# Patient Record
Sex: FEMALE | Race: BLACK OR AFRICAN AMERICAN | NOT HISPANIC OR LATINO | Employment: UNEMPLOYED | URBAN - METROPOLITAN AREA
[De-identification: names, ages, dates, MRNs, and addresses within clinical notes are randomized per-mention and may not be internally consistent; named-entity substitution may affect disease eponyms.]

---
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
## 2019-06-21 NOTE — ED NOTES
Bed: ED23  Expected date: 12/31/17  Expected time: 8:28 PM  Means of arrival: Ambulance  Comments:  BV2. 17F. Vag Bleed     [Yes] : Patient goes to dentist yearly [Normal] : normal [LMP: _____] : LMP: [unfilled] [Sleep Concerns] : sleep concerns [Grade: ____] : Grade: [unfilled] [Uses electronic nicotine delivery system] : uses electronic nicotine delivery system [No] : No cigarette smoke exposure [Uses safety belts/safety equipment] : uses safety belts/safety equipment  [With Teen] : teen [Uses tobacco] : does not use tobacco [Uses drugs] : does not use drugs  [Drinks alcohol] : does not drink alcohol [Impaired/distracted driving] : no impaired/distracted driving [FreeTextEntry7] : doing well [de-identified] : none today [de-identified] : doing well [de-identified] : variety of foods [de-identified] : goes to gym, works at [employer] [FreeTextEntry1] : 19 yo female presents for well visit

## 2020-07-14 ENCOUNTER — HOSPITAL ENCOUNTER (EMERGENCY)
Facility: CLINIC | Age: 20
Discharge: HOME | End: 2020-07-15
Attending: EMERGENCY MEDICINE | Admitting: EMERGENCY MEDICINE
Payer: COMMERCIAL

## 2020-07-14 ENCOUNTER — OFFICE VISIT (OUTPATIENT)
Dept: URGENT CARE | Facility: URGENT CARE | Age: 20
End: 2020-07-14
Payer: COMMERCIAL

## 2020-07-14 VITALS
WEIGHT: 117.5 LBS | DIASTOLIC BLOOD PRESSURE: 82 MMHG | SYSTOLIC BLOOD PRESSURE: 121 MMHG | TEMPERATURE: 98.8 F | OXYGEN SATURATION: 100 % | BODY MASS INDEX: 19.55 KG/M2 | HEART RATE: 93 BPM

## 2020-07-14 DIAGNOSIS — N30.00 ACUTE CYSTITIS WITHOUT HEMATURIA: ICD-10-CM

## 2020-07-14 DIAGNOSIS — N10 PYELONEPHRITIS, ACUTE: ICD-10-CM

## 2020-07-14 DIAGNOSIS — R30.0 DYSURIA: Primary | ICD-10-CM

## 2020-07-14 LAB
ALBUMIN UR-MCNC: 30 MG/DL
ANION GAP SERPL CALCULATED.3IONS-SCNC: 6 MMOL/L (ref 3–14)
APPEARANCE UR: CLEAR
BACTERIA #/AREA URNS HPF: ABNORMAL /HPF
BILIRUB UR QL STRIP: NEGATIVE
BUN SERPL-MCNC: 6 MG/DL (ref 7–30)
CALCIUM SERPL-MCNC: 8 MG/DL (ref 8.5–10.1)
CHLORIDE SERPL-SCNC: 102 MMOL/L (ref 94–109)
CO2 SERPL-SCNC: 26 MMOL/L (ref 20–32)
COLOR UR AUTO: YELLOW
CREAT SERPL-MCNC: 0.82 MG/DL (ref 0.52–1.04)
GFR SERPL CREATININE-BSD FRML MDRD: >90 ML/MIN/{1.73_M2}
GLUCOSE SERPL-MCNC: 98 MG/DL (ref 70–99)
GLUCOSE UR STRIP-MCNC: NEGATIVE MG/DL
HGB UR QL STRIP: ABNORMAL
KETONES UR STRIP-MCNC: NEGATIVE MG/DL
LACTATE BLD-SCNC: 0.8 MMOL/L (ref 0.7–2)
LEUKOCYTE ESTERASE UR QL STRIP: ABNORMAL
NITRATE UR QL: NEGATIVE
NON-SQ EPI CELLS #/AREA URNS LPF: ABNORMAL /LPF
PH UR STRIP: 6 PH (ref 5–7)
POTASSIUM SERPL-SCNC: 3.2 MMOL/L (ref 3.4–5.3)
RBC #/AREA URNS AUTO: ABNORMAL /HPF
SODIUM SERPL-SCNC: 134 MMOL/L (ref 133–144)
SOURCE: ABNORMAL
SP GR UR STRIP: 1.01 (ref 1–1.03)
SPECIMEN SOURCE: NORMAL
UROBILINOGEN UR STRIP-ACNC: 1 EU/DL (ref 0.2–1)
WBC #/AREA URNS AUTO: >100 /HPF
WET PREP SPEC: NORMAL

## 2020-07-14 PROCEDURE — 80048 BASIC METABOLIC PNL TOTAL CA: CPT | Performed by: EMERGENCY MEDICINE

## 2020-07-14 PROCEDURE — 25000132 ZZH RX MED GY IP 250 OP 250 PS 637: Performed by: EMERGENCY MEDICINE

## 2020-07-14 PROCEDURE — 87210 SMEAR WET MOUNT SALINE/INK: CPT | Performed by: FAMILY MEDICINE

## 2020-07-14 PROCEDURE — 99284 EMERGENCY DEPT VISIT MOD MDM: CPT | Mod: 25

## 2020-07-14 PROCEDURE — 99203 OFFICE O/P NEW LOW 30 MIN: CPT | Performed by: FAMILY MEDICINE

## 2020-07-14 PROCEDURE — 83605 ASSAY OF LACTIC ACID: CPT | Performed by: EMERGENCY MEDICINE

## 2020-07-14 PROCEDURE — 96365 THER/PROPH/DIAG IV INF INIT: CPT

## 2020-07-14 PROCEDURE — 84702 CHORIONIC GONADOTROPIN TEST: CPT

## 2020-07-14 PROCEDURE — 25800030 ZZH RX IP 258 OP 636: Performed by: EMERGENCY MEDICINE

## 2020-07-14 PROCEDURE — 87186 SC STD MICRODIL/AGAR DIL: CPT | Performed by: FAMILY MEDICINE

## 2020-07-14 PROCEDURE — 96375 TX/PRO/DX INJ NEW DRUG ADDON: CPT

## 2020-07-14 PROCEDURE — 85025 COMPLETE CBC W/AUTO DIFF WBC: CPT | Performed by: EMERGENCY MEDICINE

## 2020-07-14 PROCEDURE — 25000128 H RX IP 250 OP 636: Performed by: EMERGENCY MEDICINE

## 2020-07-14 PROCEDURE — 87088 URINE BACTERIA CULTURE: CPT | Performed by: FAMILY MEDICINE

## 2020-07-14 PROCEDURE — 87086 URINE CULTURE/COLONY COUNT: CPT | Performed by: FAMILY MEDICINE

## 2020-07-14 PROCEDURE — 81001 URINALYSIS AUTO W/SCOPE: CPT | Performed by: FAMILY MEDICINE

## 2020-07-14 RX ORDER — CEPHALEXIN 250 MG/5ML
10 POWDER, FOR SUSPENSION ORAL 3 TIMES DAILY
Qty: 210 ML | Refills: 0 | Status: SHIPPED | OUTPATIENT
Start: 2020-07-14 | End: 2020-07-21

## 2020-07-14 RX ORDER — ACETAMINOPHEN 500 MG
1000 TABLET ORAL ONCE
Status: COMPLETED | OUTPATIENT
Start: 2020-07-14 | End: 2020-07-14

## 2020-07-14 RX ORDER — CEFTRIAXONE 1 G/1
1 INJECTION, POWDER, FOR SOLUTION INTRAMUSCULAR; INTRAVENOUS ONCE
Status: COMPLETED | OUTPATIENT
Start: 2020-07-14 | End: 2020-07-15

## 2020-07-14 RX ORDER — KETOROLAC TROMETHAMINE 15 MG/ML
15 INJECTION, SOLUTION INTRAMUSCULAR; INTRAVENOUS ONCE
Status: COMPLETED | OUTPATIENT
Start: 2020-07-14 | End: 2020-07-14

## 2020-07-14 RX ORDER — CEPHALEXIN 250 MG/5ML
500 POWDER, FOR SUSPENSION ORAL 3 TIMES DAILY
Qty: 210 ML | Refills: 0 | Status: SHIPPED | OUTPATIENT
Start: 2020-07-14 | End: 2020-07-21

## 2020-07-14 RX ADMIN — SODIUM CHLORIDE 500 ML: 9 INJECTION, SOLUTION INTRAVENOUS at 23:23

## 2020-07-14 RX ADMIN — ACETAMINOPHEN 1000 MG: 500 TABLET, FILM COATED ORAL at 23:23

## 2020-07-14 RX ADMIN — CEFTRIAXONE SODIUM 1 G: 1 INJECTION, POWDER, FOR SOLUTION INTRAMUSCULAR; INTRAVENOUS at 23:23

## 2020-07-14 RX ADMIN — KETOROLAC TROMETHAMINE 15 MG: 15 INJECTION, SOLUTION INTRAMUSCULAR; INTRAVENOUS at 23:23

## 2020-07-14 NOTE — ED AVS SNAPSHOT
[facility] Emergency Department  [address]  [facility] [address]  Phone:  [phone]  Fax:  [phone]                                    Muna Husain   MRN: [id number]    Department:  [facility] Emergency Department   Date of Visit:  7/14/2020           After Visit Summary Signature Page    I have received my discharge instructions, and my questions have been answered. I have discussed any challenges I see with this plan with the nurse or doctor.    ..........................................................................................................................................  Patient/Patient Representative Signature      ..........................................................................................................................................  Patient Representative Print Name and Relationship to Patient    ..................................................               ................................................  Date                                   Time    ..........................................................................................................................................  Reviewed by Signature/Title    ...................................................              ..............................................  Date                                               Time          22EPIC Rev 08/18

## 2020-07-14 NOTE — PATIENT INSTRUCTIONS
"Cephalexin three times a day for the next week to fight urinary tract infection.    If you want to get relief from the pain, you can take AZO/Urostate.  This only comes as a pill.  Follow the instructions on the package for this medicine.    Drink lots of water.  Patient Education     Bladder Infection, Female (Adult)    Urine is normally doesn't have any bacteria in it. But bacteria can get into the urinary tract from the skin around the rectum. Or they can travel in the blood from elsewhere in the body. Once they are in your urinary tract, they can cause infection in the urethra (urethritis), the bladder (cystitis), or the kidneys (pyelonephritis).  The most common place for an infection is in the bladder. This is called a bladder infection. This is one of the most common infections in women. Most bladder infections are easily treated. They are not serious unless the infection spreads to the kidney.  The phrases \"bladder infection,\" \"UTI,\" and \"cystitis\" are often used to describe the same thing. But they are not always the same. Cystitis is an inflammation of the bladder. The most common cause of cystitis is an infection.  Symptoms  The infection causes inflammation in the urethra and bladder. This causes many of the symptoms. The most common symptoms of a bladder infection are:    Pain or burning when urinating    Having to urinate more often than usual    Urgent need to urinate    Only a small amount of urine comes out    Blood in urine    Abdominal discomfort. This is usually in the lower abdomen above the pubic bone.    Cloudy urine    Strong- or bad-smelling urine    Unable to urinate (urinary retention)    Unable to hold urine in (urinary incontinence)    Fever    Loss of appetite    Confusion (in older adults)  Causes  Bladder infections are not contagious. You can't get one from someone else, from a toilet seat, or from sharing a bath.  The most common cause of bladder infections is bacteria from the " bowels. The bacteria get onto the skin around the opening of the urethra. From there, they can get into the urine and travel up to the bladder, causing inflammation and infection. This usually happens because of:    Wiping improperly after urinating. Always wipe from front to back.    Bowel incontinence    Pregnancy    Procedures such as having a catheter inserted    Older age    Not emptying your bladder. This can allow bacteria a chance to grow in your urine.    Dehydration    Constipation    Sex    Use of a diaphragm for birth control   Treatment  Bladder infections are diagnosed by a urine test. They are treated with antibiotics and usually clear up quickly without complications. Treatment helps prevent a more serious kidney infection.  Medicines  Medicines can help in the treatment of a bladder infection:    Take antibiotics until they are used up, even if you feel better. It is important to finish them to make sure the infection has cleared.    You can use acetaminophen or ibuprofen for pain, fever, or discomfort, unless another medicine was prescribed. If you have chronic liver or kidney disease, talk with your healthcare provider before using these medicines. Also talk with your provider if you've ever had a stomach ulcer or gastrointestinal bleeding, or are taking blood-thinner medicines.    If you are given phenazopydridine to reduce burning with urination, it will cause your urine to become a bright orange color. This can stain clothing.  Care and prevention  These self-care steps can help prevent future infections:    Drink plenty of fluids to prevent dehydration and flush out your bladder. Do this unless you must restrict fluids for other health reasons, or your doctor told you not to.    Proper cleaning after going to the bathroom is important. Wipe from front to back after using the toilet to prevent the spread of bacteria.    Urinate more often. Don't try to hold urine in for a long time.    Wear  loose-fitting clothes and cotton underwear. Avoid tight-fitting pants.    Improve your diet and prevent constipation. Eat more fresh fruit and vegetables, and fiber, and less junk and fatty foods.    Avoid sex until your symptoms are gone.    Avoid caffeine, alcohol, and spicy foods. These can irritate your bladder.    Urinate right after intercourse to flush out your bladder.    If you use birth control pills and have frequent bladder infections, discuss it with your doctor.  Follow-up care  Call your healthcare provider if all symptoms are not gone after 3 days of treatment. This is especially important if you have repeat infections.  If a culture was done, you will be told if your treatment needs to be changed. If directed, you can call to find out the results.  If X-rays were done, you will be told if the results will affect your treatment.  Call 911  Call 911 if any of the following occur:    Trouble breathing    Hard to wake up or confusion    Fainting or loss of consciousness    Rapid heart rate  When to seek medical advice  Call your healthcare provider right away if any of these occur:    Fever of 100.4 F (38.0 C) or higher, or as directed by your healthcare provider    Symptoms are not better by the third day of treatment    Back or belly (abdominal) pain that gets worse    Repeated vomiting, or unable to keep medicine down    Weakness or dizziness    Vaginal discharge    Pain, redness, or swelling in the outer vaginal area (labia)  Date Last Reviewed: 10/1/2016    0480-6589 [employer]. [address] [place] [address], [place] [address]. All rights reserved. This information is not intended as a substitute for professional medical care. Always follow your healthcare professional's instructions.

## 2020-07-14 NOTE — PROGRESS NOTES
SUBJECTIVE:   Muna Husain is a 20 year old female who  presents today for a possible UTI. Symptoms of dysuria have been going on for 1day(s).  Hematuria no.  sudden onset and still present and moderate.  There is no history of fever, chills, nausea or vomiting.  No history of vaginal discharge. This patient does have a history of urinary tract infections. Patient denies long duration, rigors and flank pain.    Past Medical History:   Diagnosis Date     Heavy menstrual bleeding      Current Outpatient Medications   Medication Sig Dispense Refill     benzonatate (TESSALON) 200 MG capsule Take 1 capsule (200 mg) by mouth 3 times daily as needed for cough 21 capsule 0     ferrous sulfate (SLO-FE) 142 (45 FE) MG TBCR Take 1 tablet (142 mg) by mouth 3 times daily (with meals) 30 tablet 3     Pediatric Multivit-Minerals-C ([facility] GUMMY MULTIVITAMIN KIDS) CHEW Take 1 tablet by mouth daily 60 tablet 3     Social History     Tobacco Use     Smoking status: Never Smoker     Smokeless tobacco: Never Used   Substance Use Topics     Alcohol use: No     ROS:   As above.    OBJECTIVE:  /82   Pulse 93   Temp 98.8  F (37.1  C)   Wt 53.3 kg (117 lb 8 oz)   SpO2 100%   BMI 19.55 kg/m    GENERAL APPEARANCE: healthy, alert and no distress    Results for orders placed or performed in visit on 07/14/20   *UA reflex to Microscopic and Culture ([facility] (except [place] and [place])     Status: Abnormal    Specimen: Midstream Urine   Result Value Ref Range    Color Urine Yellow     Appearance Urine Clear     Glucose Urine Negative NEG^Negative mg/dL    Bilirubin Urine Negative NEG^Negative    Ketones Urine Negative NEG^Negative mg/dL    Specific Gravity Urine 1.015 1.003 - 1.035    Blood Urine Small (A) NEG^Negative    pH Urine 6.0 5.0 - 7.0 pH    Protein Albumin Urine 30 (A) NEG^Negative mg/dL    Urobilinogen Urine 1.0 0.2 - 1.0 EU/dL    Nitrite Urine Negative NEG^Negative    Leukocyte Esterase Urine Moderate  (A) NEG^Negative    Source Midstream Urine    Urine Microscopic     Status: Abnormal   Result Value Ref Range    WBC Urine >100 (A) OTO5^0 - 5 /HPF    RBC Urine 10-25 (A) OTO2^O - 2 /HPF    Squamous Epithelial /LPF Urine Few FEW^Few /LPF    Bacteria Urine Many (A) NEG^Negative /HPF   Wet prep     Status: None    Specimen: Vagina   Result Value Ref Range    Specimen Description Vagina     Wet Prep No Trichomonas seen     Wet Prep No clue cells seen     Wet Prep No yeast seen     Wet Prep Rare  WBC'S seen        ASSESSMENT:   Lower, uncomplicated urinary tract infection.    PLAN:  Cephalexin TID x 7 days as per ordered above.  Pt has trouble with pills and prefers a liquid medicine option.  Drink plenty of fluids.  Prevention and treatment of UTI's discussed.Signs and symptoms of pyelonephritis mentioned.  Follow up if not better in 3 days, sooner if worsening.

## 2020-07-15 VITALS
OXYGEN SATURATION: 100 % | TEMPERATURE: 100.2 F | RESPIRATION RATE: 16 BRPM | DIASTOLIC BLOOD PRESSURE: 67 MMHG | HEART RATE: 96 BPM | SYSTOLIC BLOOD PRESSURE: 112 MMHG

## 2020-07-15 LAB
ALBUMIN UR-MCNC: NEGATIVE MG/DL
APPEARANCE UR: CLEAR
B-HCG FREE SERPL-ACNC: <5 IU/L
BILIRUB UR QL STRIP: NEGATIVE
COLOR UR AUTO: ABNORMAL
DIFFERENTIAL METHOD BLD: ABNORMAL
ERYTHROCYTE [DISTWIDTH] IN BLOOD BY AUTOMATED COUNT: 19.6 % (ref 10–15)
GLUCOSE UR STRIP-MCNC: NEGATIVE MG/DL
HCT VFR BLD AUTO: 31.9 % (ref 35–47)
HGB BLD-MCNC: 8.9 G/DL (ref 11.7–15.7)
HGB UR QL STRIP: ABNORMAL
KETONES UR STRIP-MCNC: ABNORMAL MG/DL
LEUKOCYTE ESTERASE UR QL STRIP: ABNORMAL
MCH RBC QN AUTO: 19.8 PG (ref 26.5–33)
MCHC RBC AUTO-ENTMCNC: 27.9 G/DL (ref 31.5–36.5)
MCV RBC AUTO: 71 FL (ref 78–100)
MUCOUS THREADS #/AREA URNS LPF: PRESENT /LPF
NITRATE UR QL: NEGATIVE
PH UR STRIP: 7 PH (ref 5–7)
PLATELET # BLD AUTO: 236 10E9/L (ref 150–450)
RBC # BLD AUTO: 4.49 10E12/L (ref 3.8–5.2)
RBC #/AREA URNS AUTO: 8 /HPF (ref 0–2)
SOURCE: ABNORMAL
SP GR UR STRIP: 1.01 (ref 1–1.03)
SQUAMOUS #/AREA URNS AUTO: 3 /HPF (ref 0–1)
UROBILINOGEN UR STRIP-MCNC: NORMAL MG/DL (ref 0–2)
WBC # BLD AUTO: 7.3 10E9/L (ref 4–11)
WBC #/AREA URNS AUTO: 160 /HPF (ref 0–5)

## 2020-07-15 PROCEDURE — 81001 URINALYSIS AUTO W/SCOPE: CPT | Performed by: EMERGENCY MEDICINE

## 2020-07-15 PROCEDURE — 87086 URINE CULTURE/COLONY COUNT: CPT | Performed by: EMERGENCY MEDICINE

## 2020-07-15 NOTE — DISCHARGE INSTRUCTIONS
Take the antibiotic as prescribed.  We have given you another prescription of the antibiotic for an additional 7 days.     Alternate Tylenol and ibuprofen every 4-6 hours as needed for pain/fever.    Follow up with your primary care doctor in the next 3 days regarding your presentation today.    Return to the ER if you develop any persistent fever, worsening pain, persistent nausea/vomiting or any new concerning symptom.

## 2020-07-15 NOTE — ED PROVIDER NOTES
History     Chief Complaint:  Fever and Flank Pain    HPI   Muna Husain is a 20 year old otherwise healthy female, with history of UTIs, who presents alone for evaluation of asubjective fever x 1 with associated dysuria that started yesterday.  Patient seen in urgent care today for the symptoms and was found to have UTI and started on Keflex.  She has had 1 dose today.  She went to sleep and when she woke up at 5 PM, she had right flank pain and continued subjective fevers.  She denies any chest pain, coughing, shortness of breath, abdominal pain, history of kidney stones, abnormal vaginal discharge or concern for STI.  Last menstrual period was last week and she has no concerns for pregnancy.      UA Results from  Visit 7/14/2020:  UA reflex to Microscopic and Culture: Blood Urine Small (A), Protein Albumin Urine 30 (A), Leukocyte Esterase Urine Moderate (A) o/w WNL   Urine Microscopic: WBC >100 (A), RBC Urine 10-25 (A), Bacteria Urine Many (A) o/w WNL  Wet Prep: AWNL    Allergies:  No Known Drug Allergies     Medications:    Keflex    Past Medical History:    Heavy menstrual bleeding    Past Surgical History:    History reviewed. No pertinent past surgical history.     Family History:    History reviewed. No pertinent family history.       Social History:  The patient was accompanied to the ED by family.  Smoking Status: Never  Smokeless Tobacco: Never  Alcohol Use: No  Drug Use: No   Marital Status:  Single [1]     Review of Systems    Physical Exam     Patient Vitals for the past 24 hrs:   BP Temp Temp src Pulse Heart Rate Resp SpO2   07/15/20 0055 112/67 -- -- -- -- 16 100 %   07/14/20 2121 103/72 100.2  F (37.9  C) Temporal 96 96 16 --       Physical Exam  General: Alert, no acute distress; nontoxic appearing  HEENT:  Moist mucous membranes. Conjunctiva normal.   CV:  RRR, no m/r/g, skin warm and well perfused  Pulm:  CTAB, no wheezes/ronchi/rales.  No acute distress, breathing comfortably  GI:  Soft,  nontender, nondistended.  No rebound or guarding.  Normal bowel sounds  MSK:  Moving all extremities.  No focal areas of edema, erythema; right CVA tenderness  Skin:  WWP, no rashes, no lower extremity edema, skin color normal, no diaphoresis    Emergency Department Course       Laboratory:  Labs Ordered and Resulted from Time of ED Arrival Up to the Time of Departure from the ED   CBC WITH PLATELETS DIFFERENTIAL - Abnormal; Notable for the following components:       Result Value    Hemoglobin 8.9 (*)     Hematocrit 31.9 (*)     MCV 71 (*)     MCH 19.8 (*)     MCHC 27.9 (*)     RDW 19.6 (*)     All other components within normal limits   BASIC METABOLIC PANEL - Abnormal; Notable for the following components:    Potassium 3.2 (*)     Urea Nitrogen 6 (*)     Calcium 8.0 (*)     All other components within normal limits   ROUTINE UA WITH MICROSCOPIC - Abnormal; Notable for the following components:    Ketones Urine Trace (*)     Blood Urine Moderate (*)     Leukocyte Esterase Urine Large (*)     WBC Urine 160 (*)     RBC Urine 8 (*)     Squamous Epithelial /HPF Urine 3 (*)     Mucous Urine Present (*)     All other components within normal limits   LACTIC ACID WHOLE BLOOD   ISTAT HCG QUANTITATIVE PREGNANCY NURSING POCT   PERIPHERAL IV CATHETER   URINE CULTURE AEROBIC BACTERIAL             Interventions:  2323 Tylenol 1000 mg PO  Rocephin 500 mg IV  0.9% NaCl Bolus 1000mL IV  Toradol 15 mg IV    Medications   cefTRIAXone (ROCEPHIN) 1 g vial to attach to  mL bag for ADULTS or NS 50 mL bag for PEDS (0 g Intravenous Stopped 7/15/20 0044)   acetaminophen (TYLENOL) tablet 1,000 mg (1,000 mg Oral Given 7/14/20 2323)   0.9% sodium chloride BOLUS (0 mLs Intravenous Stopped 7/15/20 0044)   ketorolac (TORADOL) injection 15 mg (15 mg Intravenous Given 7/14/20 2323)       Emergency Department Course:  Past medical records, nursing notes, and vitals reviewed.    (2250)   I performed an exam of the patient as documented above.  History obtained from patient.    IV was inserted and blood was drawn for laboratory testing, results above.    The patient provided a urine sample here in the emergency department. This was sent for laboratory testing, findings above.     I rechecked the patient and discussed the results of her workup thus far.    Findings and plan explained to the Patient. Patient discharged home with instructions regarding supportive care, medications, and reasons to return. The importance of close follow-up was reviewed. The patient was prescribed Keflex x 7 days.    I personally reviewed the laboratory  and imaging results with the Patient and answered all related questions prior to discharge.     Impression & Plan     Medical Decision Making:    Muna Husain is a 20 year old female who presents for evaluation of right flank pain, fever.  UA today at  concerning for UTI.  She has taken 1 dose of Keflex.  She has low grade fever here but is otherwise well appearing.  Urinalysis here is consistent with a urinary tract infection; given the systemic symptoms present, signs and symptoms consistent with pyelonephritis.   There is no clinical evidence of perinephric abscess, emphysematous pyelonephritis, ureterolithiasis, appendicitis, colitis, diverticulitis or any intraabdominal catastrophe.  Labs reassuring including lactic acid. She is not pregnant (RN notes iSTAT HCG is negative). I do not feel any imaging is indicated.  Patient was given dose of IV Ceftriaxone in ED; see above.      Given well appearance, I believe the risk of imminent deterioration is low enough that outpatient management is indicated.  Return if increasing pain, vomiting, fever, or inability to tolerate the oral antibiotic.  Follow up with primary physician is indicated in 3 days.    Diagnosis:    ICD-10-CM    1. Pyelonephritis, acute  N10 CBC + differential     Basic metabolic panel (BMP)     UA with Microscopic     Urine Culture Aerobic Bacterial   2.  Acute cystitis without hematuria  N30.00        Disposition:  Discharged to home.    Discharge Medications:  Discharge Medication List as of 7/15/2020 12:59 AM      START taking these medications    Details   !! cephALEXin (KEFLEX) 250 MG/5ML suspension Take 10 mLs (500 mg) by mouth 3 times daily for 7 days, Disp-210 mL,R-0, Local Print       !! - Potential duplicate medications found. Please discuss with provider.          Scribe Disclosure:  I, Keya Leonard, am serving as a scribe at 10:41 PM on 7/14/2020 to document services personally performed by Alfred Pedraza MD based on my observations and the provider's statements to me.   7/14/2020   [facility] EMERGENCY DEPARTMENT       Alfred Pedraza MD  07/15/20 0105

## 2020-07-16 LAB
BACTERIA SPEC CULT: NO GROWTH
Lab: NORMAL
SPECIMEN SOURCE: NORMAL

## 2020-07-22 LAB
BACTERIA SPEC CULT: ABNORMAL
SPECIMEN SOURCE: ABNORMAL

## 2020-08-13 ENCOUNTER — OFFICE VISIT (OUTPATIENT)
Dept: URGENT CARE | Facility: URGENT CARE | Age: 20
End: 2020-08-13
Payer: COMMERCIAL

## 2020-08-13 VITALS
OXYGEN SATURATION: 100 % | DIASTOLIC BLOOD PRESSURE: 56 MMHG | HEART RATE: 83 BPM | TEMPERATURE: 97.6 F | WEIGHT: 118.5 LBS | RESPIRATION RATE: 16 BRPM | SYSTOLIC BLOOD PRESSURE: 105 MMHG | BODY MASS INDEX: 19.72 KG/M2

## 2020-08-13 DIAGNOSIS — R30.0 DYSURIA: Primary | ICD-10-CM

## 2020-08-13 LAB
ALBUMIN UR-MCNC: 100 MG/DL
APPEARANCE UR: CLEAR
BACTERIA #/AREA URNS HPF: ABNORMAL /HPF
BILIRUB UR QL STRIP: NEGATIVE
COLOR UR AUTO: YELLOW
GLUCOSE UR STRIP-MCNC: NEGATIVE MG/DL
HGB UR QL STRIP: ABNORMAL
KETONES UR STRIP-MCNC: NEGATIVE MG/DL
LEUKOCYTE ESTERASE UR QL STRIP: ABNORMAL
NITRATE UR QL: NEGATIVE
NON-SQ EPI CELLS #/AREA URNS LPF: ABNORMAL /LPF
PH UR STRIP: 5.5 PH (ref 5–7)
RBC #/AREA URNS AUTO: ABNORMAL /HPF
SOURCE: ABNORMAL
SP GR UR STRIP: 1.02 (ref 1–1.03)
SPECIMEN SOURCE: ABNORMAL
UROBILINOGEN UR STRIP-ACNC: 0.2 EU/DL (ref 0.2–1)
WBC #/AREA URNS AUTO: ABNORMAL /HPF
WET PREP SPEC: ABNORMAL

## 2020-08-13 PROCEDURE — 81001 URINALYSIS AUTO W/SCOPE: CPT | Performed by: PHYSICIAN ASSISTANT

## 2020-08-13 PROCEDURE — 99213 OFFICE O/P EST LOW 20 MIN: CPT | Performed by: FAMILY MEDICINE

## 2020-08-13 PROCEDURE — 87210 SMEAR WET MOUNT SALINE/INK: CPT | Performed by: PHYSICIAN ASSISTANT

## 2020-08-13 PROCEDURE — 87086 URINE CULTURE/COLONY COUNT: CPT | Performed by: FAMILY MEDICINE

## 2020-08-13 RX ORDER — CIPROFLOXACIN 250 MG/1
250 TABLET, FILM COATED ORAL 2 TIMES DAILY
Qty: 14 TABLET | Refills: 0 | Status: SHIPPED | OUTPATIENT
Start: 2020-08-13 | End: 2020-08-20

## 2020-08-13 NOTE — PROGRESS NOTES
CHIEF COMPLAINT    DYSURIA      HISTORY    Muna has a 2 week h/o dysuria and urgency. No fever. No back or abd pain.    She had a UTI 7- and ultimately received IV Rocephin in the E R. UC 7-14 showed e coli UTI.    She is not having vaginal SX such as discharge.      Patient Active Problem List   Diagnosis     Excessive or frequent menstruation     Microcytic hypochromic anemia       Current Outpatient Medications   Medication Sig Dispense Refill     benzonatate (TESSALON) 200 MG capsule Take 1 capsule (200 mg) by mouth 3 times daily as needed for cough (Patient not taking: Reported on 8/13/2020) 21 capsule 0     ferrous sulfate (SLO-FE) 142 (45 FE) MG TBCR Take 1 tablet (142 mg) by mouth 3 times daily (with meals) (Patient not taking: Reported on 8/13/2020) 30 tablet 3     Pediatric Multivit-Minerals-C ([employer] GUMMY MULTIVITAMIN KIDS) CHEW Take 1 tablet by mouth daily (Patient not taking: Reported on 8/13/2020) 60 tablet 3       REVIEW OF SYSTEMS    No fever  No cough  No V or D  No rash      Past Medical History:   Diagnosis Date     Heavy menstrual bleeding        EXAM  /56   Pulse 83   Temp 97.6  F (36.4  C) (Tympanic)   Resp 16   Wt 53.8 kg (118 lb 8 oz)   SpO2 100%   BMI 19.72 kg/m      NAD.  No CVAT  Abd non distended, non tender.      Results for orders placed or performed in visit on 08/13/20   *UA reflex to Microscopic and Culture ([place] [facility] (except [place] and [place])     Status: Abnormal    Specimen: Midstream Urine   Result Value Ref Range    Color Urine Yellow     Appearance Urine Clear     Glucose Urine Negative NEG^Negative mg/dL    Bilirubin Urine Negative NEG^Negative    Ketones Urine Negative NEG^Negative mg/dL    Specific Gravity Urine 1.020 1.003 - 1.035    Blood Urine Trace (A) NEG^Negative    pH Urine 5.5 5.0 - 7.0 pH    Protein Albumin Urine 100 (A) NEG^Negative mg/dL    Urobilinogen Urine 0.2 0.2 - 1.0 EU/dL    Nitrite Urine Negative NEG^Negative     Leukocyte Esterase Urine Trace (A) NEG^Negative    Source Midstream Urine    Urine Microscopic     Status: Abnormal   Result Value Ref Range    WBC Urine 10-25 (A) OTO5^0 - 5 /HPF    RBC Urine O - 2 OTO2^O - 2 /HPF    Bacteria Urine Few (A) NEG^Negative /HPF    Squamous Epithelial /LPF Urine Few FEW^Few /LPF   Wet prep     Status: Abnormal    Specimen: Vagina   Result Value Ref Range    Specimen Description Vagina     Wet Prep No Trichomonas seen     Wet Prep Few  Clue cells seen   (A)     Wet Prep No yeast seen     Wet Prep Few  WBC'S seen            (R30.0) Dysuria  (primary encounter diagnosis)  Comment:     Unspun urine - obtain culture. Second U/A report is more abnormal. Initially 0 WBC's then reported as 10-25 WBC's.  Patient was treated.    Presumptive treatment of UTI.    Plan: *UA reflex to Microscopic and Culture ([place]         [facility] (except [place] and         [place]), Wet prep, Urine Microscopic, Urine         Culture Aerobic Bacterial, ciprofloxacin         (CIPRO) 250 MG tablet          Suggested clinic appt re BC.

## 2020-08-16 LAB
BACTERIA SPEC CULT: NORMAL
SPECIMEN SOURCE: NORMAL

## 2020-10-03 ENCOUNTER — ANCILLARY PROCEDURE (OUTPATIENT)
Dept: GENERAL RADIOLOGY | Facility: CLINIC | Age: 20
End: 2020-10-03
Attending: INTERNAL MEDICINE
Payer: COMMERCIAL

## 2020-10-03 ENCOUNTER — OFFICE VISIT (OUTPATIENT)
Dept: URGENT CARE | Facility: URGENT CARE | Age: 20
End: 2020-10-03
Payer: COMMERCIAL

## 2020-10-03 VITALS
TEMPERATURE: 98.3 F | OXYGEN SATURATION: 100 % | HEART RATE: 82 BPM | BODY MASS INDEX: 19.64 KG/M2 | DIASTOLIC BLOOD PRESSURE: 64 MMHG | SYSTOLIC BLOOD PRESSURE: 102 MMHG | WEIGHT: 118 LBS

## 2020-10-03 DIAGNOSIS — Z32.00 POSSIBLE PREGNANCY: ICD-10-CM

## 2020-10-03 DIAGNOSIS — R10.84 ABDOMINAL PAIN, GENERALIZED: Primary | ICD-10-CM

## 2020-10-03 LAB — HCG UR QL: NEGATIVE

## 2020-10-03 PROCEDURE — 99214 OFFICE O/P EST MOD 30 MIN: CPT | Performed by: INTERNAL MEDICINE

## 2020-10-03 PROCEDURE — 74019 RADEX ABDOMEN 2 VIEWS: CPT | Performed by: RADIOLOGY

## 2020-10-03 PROCEDURE — 81025 URINE PREGNANCY TEST: CPT | Performed by: INTERNAL MEDICINE

## 2020-10-03 ASSESSMENT — PAIN SCALES - GENERAL: PAINLEVEL: EXTREME PAIN (8)

## 2020-10-03 NOTE — PROGRESS NOTES
"SUBJECTIVE:  Muna Husain, a 20 year old female, presents for evaluation of abdominal pain.  She states that this started last night.  She had consumed an \"appetite stimulant\" syrup and then she ate prior to going to sleep. She then woke with in the middle of the night with pain that migrated from the right side to the left and had the greatest discomfort in the epigastric region.  She felt a need to vomit.  The character of the pain is crampy, churning and grumbling.  She states that she hasn't passed a bowel movement in the past week; states that her normal pattern is to pass BM relatively infrequently.  Denies hard stools or straining, however.  Also asking about getting a pregnancy test.  She reports that she has had unprotected sex in the past two weeks.      ROS:  The following systems have been completely reviewed and are negative except as noted in the HPI: CONSTITUTIONAL, PULMONARY, GASTROINTESTINAL and GENITOURINARY    OBJECTIVE:  /64   Pulse 82   Temp 98.3  F (36.8  C) (Tympanic)   Wt 53.5 kg (118 lb)   LMP 09/04/2020   SpO2 100%   BMI 19.64 kg/m    GENERAL: thin young female, no apparent distress   NECK: no adenopathy, no asymmetry, masses, or scars and thyroid normal to palpation  RESP: clear to auscultation and percussion bilaterally; normal I:E ratio  CV: regular rates and rhythm, normal S1 S2, no S3 or S4 and no murmur, click or rub -  ABDOMEN: soft, non-distended, and with mild generalized tenderness without rebound/guarding.  There is no hepatosplenomegaly or masses and normal bowel sounds.    Plain films of the abdomen, which I have personally reviewed and interpreted, show a non-specific bowel gas pattern without substantial retained stool in the colon.    Urine HCG is negative.    ASSESSMENT/PLAN:    ICD-10-CM    1. Abdominal pain, generalized  R10.84 XR Abdomen 2 Views   2. Possible pregnancy  Z32.00 HCG Qual, Urine ([id number])   The history and physical exam is reassuring for lack of " an acute surgical process and there is no indication of a pathology that would require medical therapy immediately.  Overall pattern may be indicative of an intolerance with the appetite stimulant product that she ingested or another IBS type of pattern.  Manage with diet.  We also discussed strategies for regulating her cycle given significant irregularity.  While her current urine HCG is negative, we discussed the possibility of a false negative given her irregularity and uncertainty on timing of ovulation.  She would be a good candidate for an hormonal OCP method though she has been sensitive to side effects.  Certainly would require a low-dose estrogen if not a progesterone-only approach.    Referred to primary care for these discussions.    Michel Albrecht MD

## 2020-10-03 NOTE — PATIENT INSTRUCTIONS
Options for primary care:  Dr. Jackie Arora    Any of these doctors would be good for you to help with figuring out a strategy to regulate your cycling and your anemia.    Your pregnancy test today is negative.    The x-ray looks good too as does your physical exam with the abdomen.  There isn't anything dangerous that is causing your pain -- it's an irritability of the stomach and intestines which may be related to the appetite stimulant which you drank.  Best thing is to rest your stomach and do clear liquids for today then move to easy foods like soups and yogurt for a day or so.

## 2022-01-25 ENCOUNTER — HOSPITAL ENCOUNTER (EMERGENCY)
Facility: CLINIC | Age: 22
Discharge: HOME | End: 2022-01-25
Attending: EMERGENCY MEDICINE | Admitting: EMERGENCY MEDICINE
Payer: COMMERCIAL

## 2022-01-25 VITALS
SYSTOLIC BLOOD PRESSURE: 103 MMHG | TEMPERATURE: 98.4 F | OXYGEN SATURATION: 100 % | RESPIRATION RATE: 18 BRPM | DIASTOLIC BLOOD PRESSURE: 58 MMHG | HEART RATE: 85 BPM

## 2022-01-25 DIAGNOSIS — N92.1 MENORRHAGIA WITH IRREGULAR CYCLE: ICD-10-CM

## 2022-01-25 LAB
ABO/RH(D): NORMAL
ANION GAP SERPL CALCULATED.3IONS-SCNC: 5 MMOL/L (ref 3–14)
ANTIBODY SCREEN: NEGATIVE
BASOPHILS # BLD AUTO: 0 10E3/UL (ref 0–0.2)
BASOPHILS NFR BLD AUTO: 0 %
BLD PROD TYP BPU: NORMAL
BLOOD COMPONENT TYPE: NORMAL
BUN SERPL-MCNC: 7 MG/DL (ref 7–30)
CALCIUM SERPL-MCNC: 8.9 MG/DL (ref 8.5–10.1)
CHLORIDE BLD-SCNC: 106 MMOL/L (ref 94–109)
CO2 SERPL-SCNC: 25 MMOL/L (ref 20–32)
CODING SYSTEM: NORMAL
CREAT SERPL-MCNC: 0.66 MG/DL (ref 0.52–1.04)
CROSSMATCH: NORMAL
EOSINOPHIL # BLD AUTO: 0 10E3/UL (ref 0–0.7)
EOSINOPHIL NFR BLD AUTO: 0 %
ERYTHROCYTE [DISTWIDTH] IN BLOOD BY AUTOMATED COUNT: 17.9 % (ref 10–15)
GFR SERPL CREATININE-BSD FRML MDRD: >90 ML/MIN/1.73M2
GLUCOSE BLD-MCNC: 89 MG/DL (ref 70–99)
HCG SERPL QL: NEGATIVE
HCT VFR BLD AUTO: 25.2 % (ref 35–47)
HGB BLD-MCNC: 7.1 G/DL (ref 11.7–15.7)
IMM GRANULOCYTES # BLD: 0 10E3/UL
IMM GRANULOCYTES NFR BLD: 0 %
ISSUE DATE AND TIME: NORMAL
LYMPHOCYTES # BLD AUTO: 2.8 10E3/UL (ref 0.8–5.3)
LYMPHOCYTES NFR BLD AUTO: 41 %
MCH RBC QN AUTO: 21.1 PG (ref 26.5–33)
MCHC RBC AUTO-ENTMCNC: 28.2 G/DL (ref 31.5–36.5)
MCV RBC AUTO: 75 FL (ref 78–100)
MONOCYTES # BLD AUTO: 0.5 10E3/UL (ref 0–1.3)
MONOCYTES NFR BLD AUTO: 8 %
NEUTROPHILS # BLD AUTO: 3.5 10E3/UL (ref 1.6–8.3)
NEUTROPHILS NFR BLD AUTO: 51 %
NRBC # BLD AUTO: 0 10E3/UL
NRBC BLD AUTO-RTO: 0 /100
PLATELET # BLD AUTO: 336 10E3/UL (ref 150–450)
POTASSIUM BLD-SCNC: 3.5 MMOL/L (ref 3.4–5.3)
RBC # BLD AUTO: 3.36 10E6/UL (ref 3.8–5.2)
SODIUM SERPL-SCNC: 136 MMOL/L (ref 133–144)
SPECIMEN EXPIRATION DATE: NORMAL
UNIT ABO/RH: NORMAL
UNIT NUMBER: NORMAL
UNIT STATUS: NORMAL
UNIT TYPE ISBT: 5100
WBC # BLD AUTO: 7 10E3/UL (ref 4–11)

## 2022-01-25 PROCEDURE — 258N000003 HC RX IP 258 OP 636: Performed by: EMERGENCY MEDICINE

## 2022-01-25 PROCEDURE — 96366 THER/PROPH/DIAG IV INF ADDON: CPT

## 2022-01-25 PROCEDURE — 99285 EMERGENCY DEPT VISIT HI MDM: CPT | Mod: 25

## 2022-01-25 PROCEDURE — 84703 CHORIONIC GONADOTROPIN ASSAY: CPT | Performed by: EMERGENCY MEDICINE

## 2022-01-25 PROCEDURE — 85049 AUTOMATED PLATELET COUNT: CPT | Performed by: EMERGENCY MEDICINE

## 2022-01-25 PROCEDURE — 36415 COLL VENOUS BLD VENIPUNCTURE: CPT | Performed by: EMERGENCY MEDICINE

## 2022-01-25 PROCEDURE — 96365 THER/PROPH/DIAG IV INF INIT: CPT | Mod: 59

## 2022-01-25 PROCEDURE — P9016 RBC LEUKOCYTES REDUCED: HCPCS | Performed by: EMERGENCY MEDICINE

## 2022-01-25 PROCEDURE — 250N000011 HC RX IP 250 OP 636: Performed by: EMERGENCY MEDICINE

## 2022-01-25 PROCEDURE — 86901 BLOOD TYPING SEROLOGIC RH(D): CPT | Performed by: EMERGENCY MEDICINE

## 2022-01-25 PROCEDURE — 93005 ELECTROCARDIOGRAM TRACING: CPT | Mod: 59

## 2022-01-25 PROCEDURE — 36430 TRANSFUSION BLD/BLD COMPNT: CPT

## 2022-01-25 PROCEDURE — 80048 BASIC METABOLIC PNL TOTAL CA: CPT | Performed by: EMERGENCY MEDICINE

## 2022-01-25 PROCEDURE — 86923 COMPATIBILITY TEST ELECTRIC: CPT | Performed by: EMERGENCY MEDICINE

## 2022-01-25 RX ORDER — ONDANSETRON 4 MG/1
4 TABLET, ORALLY DISINTEGRATING ORAL EVERY 6 HOURS PRN
Qty: 10 TABLET | Refills: 0 | Status: SHIPPED | OUTPATIENT
Start: 2022-01-25 | End: 2022-01-28

## 2022-01-25 RX ORDER — IRON BIS-GLYCINAT/VIT C/FA/B12 28-60-0.4
1 CAPSULE ORAL DAILY
Qty: 30 CAPSULE | Refills: 0 | Status: SHIPPED | OUTPATIENT
Start: 2022-01-25

## 2022-01-25 RX ADMIN — IRON SUCROSE 300 MG: 20 INJECTION, SOLUTION INTRAVENOUS at 20:51

## 2022-01-25 NOTE — ED TRIAGE NOTES
Pt here with c/o lightheadedness since Dec. Reports 3 weeks vag bleeding. Pt not currently having vag bleeding. Doesn't have OBGYN. ABC intact.  by EMS.

## 2022-01-25 NOTE — ED PROVIDER NOTES
"  History     Chief Complaint:    Dizziness      HPI   Muna Husain is a 22 year old female who presents with dizziness with standing and vaginal bleeding.    Patient states she is had a history of irregular and irregularly heavy periods. These date back to 2020. Patient was placed on oral contraceptive medications which seem to do the job but also was recommended iron which she did not tolerate. Patient states \"I gave up\". Stop the medication. Pain since patient continues to have irregular periods this month has been more heavy. Patient describes using 2-3 pads a day. Patient feels dizzy and has difficulty cooking with her mom in the kitchen. She has had no abdominal pain with heavy periods but describes 2-3 pads in a day. Patient states the bleeding was worse and felt dizzy and weak and presents to the emergency room for further assessment.    Review of Systems  G0 para 0. Heavy vaginal bleeding negative for abdominal pain. All the systems negative except as above    Allergies:    No Known Allergies      Medications:      No current outpatient medications on file.      Past Medical History:      Past Medical History:   Diagnosis Date     Heavy menstrual bleeding      Patient Active Problem List    Diagnosis Date Noted     Microcytic hypochromic anemia 01/13/2018     Priority: Medium     Referred to hematology 1/13/18. - [facility]       Excessive or frequent menstruation 01/10/2018     Priority: Medium        Past Surgical History:      No past surgical history on file.    Family History:      No family history on file.    Social History:  Denies drug or alcohol use.    Physical Exam     Patient Vitals for the past 24 hrs:   BP Temp Temp src Pulse Resp SpO2   01/25/22 1517 114/66 97.6  F (36.4  C) Temporal 76 16 100 %       Physical Exam  Vitals and nursing note reviewed.   HENT:      Head: Normocephalic.      Nose: Nose normal.   Eyes:      Pupils: Pupils are equal, round, and reactive to light.      " Comments: Pale conjunctive   Cardiovascular:      Rate and Rhythm: Normal rate.   Pulmonary:      Effort: Pulmonary effort is normal.   Abdominal:      General: Abdomen is flat.      Palpations: Abdomen is soft.   Skin:     General: Skin is warm.      Capillary Refill: Capillary refill takes less than 2 seconds.   Neurological:      General: No focal deficit present.      Mental Status: She is alert.   Psychiatric:         Mood and Affect: Mood normal.           Emergency Department Course   ECG:*  ECG taken at 1640, ECG read at 1645     Rate 86 bpm. WV interval 114 ms. QRS duration 80 ms. QT/QTc 360/430 ms. Normal sinus rythmn    Imaging:  No orders to display       Laboratory:  Labs Ordered and Resulted from Time of ED Arrival to Time of ED Departure   CBC WITH PLATELETS AND DIFFERENTIAL - Abnormal       Result Value    WBC Count 7.0      RBC Count 3.36 (*)     Hemoglobin 7.1 (*)     Hematocrit 25.2 (*)     MCV 75 (*)     MCH 21.1 (*)     MCHC 28.2 (*)     RDW 17.9 (*)     Platelet Count 336      % Neutrophils 51      % Lymphocytes 41      % Monocytes 8      % Eosinophils 0      % Basophils 0      % Immature Granulocytes 0      NRBCs per 100 WBC 0      Absolute Neutrophils 3.5      Absolute Lymphocytes 2.8      Absolute Monocytes 0.5      Absolute Eosinophils 0.0      Absolute Basophils 0.0      Absolute Immature Granulocytes 0.0      Absolute NRBCs 0.0     BASIC METABOLIC PANEL   HCG QUALITATIVE PREGNANCY   TYPE AND SCREEN, ADULT   ABO/RH TYPE AND SCREEN         Emergency Department Course:           Reviewed:    I reviewed nursing notes and vitals    Assessments:  1645 I obtained history and examined the patient as noted above.   1900 I rechecked the patient and explained findings.       Consults:   [facility]         Interventions:    Medications   iron sucrose (VENOFER) 300 mg in sodium chloride 0.9 % 250 mL intermittent infusion (0 mg Intravenous Stopped 1/25/22 2228)       Disposition:  The patient  was discharged to home.    Impression & Plan        Medical Decision Making:  Patient presents with dizziness with standing and heavy vaginal bleeding.  Patient has a history of prior anemia and prior need for blood transfusion.  Patient is failed to follow-up as an outpatient is currently on on no oral contraceptives. Patient is found to have a hemoglobin of 7.1 is not pregnant. Has had no pain with heavy irregular periods and therefore ultrasound was not recommended. Care was discussed with the patient did consider a dose of IV Venofer at home patient is quite symptomatic from her anemia states she can only walk very short distances feels very weak cannot cook in the kitchen with her mother because she is so weak because of the anemia and is asking for everything to be done therefore a unit of blood for transfusion was ordered as well as IV Venofer. Patient stay in the emergency room was delayed due to to transfusions both blood and IV iron. Patient is encouraged to follow-up as an outpatient patient is recommended hematology assessment for failed oral iron we will trial low-dose iron to help with nausea associated with the medication as well as use nausea medication we will also recommend follow-up with GYN care was discussed with [facility] in a few packs of low overall were offered to help stop. Bleeding until follow-up with GYN.    Covid-19  Muna Husain was evaluated during a global COVID-19 pandemic, which necessitated consideration that the patient might be at risk for infection with the SARS-CoV-2 virus that causes COVID-19.   Applicable protocols for evaluation were followed during the patient's care.   COVID-19 was considered as part of the patient's evaluation.    Diagnosis:    ICD-10-CM    1. Menorrhagia with irregular cycle  N92.1        Discharge Medications:  Discharge Medication List as of 1/25/2022 10:43 PM      START taking these medications    Details   IRON-VIT C-VIT B12-FOLIC ACID  (GENTLE IRON) 28-60-0.008-0.4 MG CAPS Take 1 tablet by mouth daily, Disp-30 capsule, R-0, Local Print      norgestrel-ethinyl estradiol (LO/OVRAL) 0.3-30 MG-MCG tablet Take 1 tablet by mouth daily Take three tablets daily x1 week, discard placebo. Then take next pack one tablet daily but discard placebo Continue pills until seen by obgyn, Disp-90 tablet, R-0, Local Print      ondansetron (ZOFRAN ODT) 4 MG ODT tab Take 1 tablet (4 mg) by mouth every 6 hours as needed for nausea or vomiting, Disp-10 tablet, R-0, Local Print               Scribe Disclosure:  I, Dejuan Almazan MD, am serving as a scribe at 5:01 PM on 1/25/2022 to document services personally performed by Dejuan Almazan MD based on my observations and the provider's statements to me.      Dejuan Almazan MD  01/27/22 3438

## 2022-01-26 LAB
ATRIAL RATE - MUSE: 86 BPM
DIASTOLIC BLOOD PRESSURE - MUSE: NORMAL MMHG
INTERPRETATION ECG - MUSE: NORMAL
P AXIS - MUSE: 36 DEGREES
PR INTERVAL - MUSE: 114 MS
QRS DURATION - MUSE: 80 MS
QT - MUSE: 360 MS
QTC - MUSE: 430 MS
R AXIS - MUSE: 66 DEGREES
SYSTOLIC BLOOD PRESSURE - MUSE: NORMAL MMHG
T AXIS - MUSE: 42 DEGREES
VENTRICULAR RATE- MUSE: 86 BPM

## 2022-01-26 NOTE — DISCHARGE INSTRUCTIONS
We have discussed your care with the [facility]. Please start low overall 3 tablets daily for 1 week.  Once you finish the pack and get to the placebo or white pill line please throw the placebo pills away and start the next pack 1 pill a day for 3 weeks.  Please do not take the placebo pill.  Note that if you stop the birth control pills you will bleed heavier.  We have given you a blood transfusion due to hemoglobin of 7.1 and given you a single dose of IV Venofer which is iron.  Consider trialing Gentle Fe as a pill for iron supplementation and/or follow-up with hematology oncology to discuss iron deficiency anemia and further treatment if you cannot tolerate oral iron. Call [phone] for appointment with [facility]

## 2022-12-15 NOTE — RESULT ENCOUNTER NOTE
"Final urine culture report shows \"NO GROWTH\" and is NEGATIVE.  Emergency Dept discharge antibiotic: Cephalexin (Keflex) 250 MG/5ML susp, 10 mLs (500 mg) by mouth 3 times daily for 7 days  Is ED discharge Rx antibiotic for UTI only (Yes/No): Yes  One dose prior to collection of UC  Recommendations per [facility] ED Lab result protocol - Urine culture protocol."
No

## 2023-12-07 ENCOUNTER — HOSPITAL ENCOUNTER (EMERGENCY)
Facility: CLINIC | Age: 23
Discharge: HOME | End: 2023-12-07
Attending: EMERGENCY MEDICINE | Admitting: EMERGENCY MEDICINE
Payer: COMMERCIAL

## 2023-12-07 VITALS
DIASTOLIC BLOOD PRESSURE: 60 MMHG | TEMPERATURE: 98.2 F | RESPIRATION RATE: 16 BRPM | HEART RATE: 64 BPM | SYSTOLIC BLOOD PRESSURE: 106 MMHG | OXYGEN SATURATION: 100 %

## 2023-12-07 DIAGNOSIS — D50.9 IRON DEFICIENCY ANEMIA, UNSPECIFIED IRON DEFICIENCY ANEMIA TYPE: ICD-10-CM

## 2023-12-07 DIAGNOSIS — K92.0 HEMATEMESIS WITH NAUSEA: ICD-10-CM

## 2023-12-07 LAB
ACANTHOCYTES BLD QL SMEAR: ABNORMAL
AUER BODIES BLD QL SMEAR: ABNORMAL
BASO STIPL BLD QL SMEAR: ABNORMAL
BASOPHILS # BLD AUTO: 0 10E3/UL (ref 0–0.2)
BASOPHILS NFR BLD AUTO: 0 %
BITE CELLS BLD QL SMEAR: ABNORMAL
BLISTER CELLS BLD QL SMEAR: ABNORMAL
BURR CELLS BLD QL SMEAR: ABNORMAL
DACRYOCYTES BLD QL SMEAR: ABNORMAL
ELLIPTOCYTES BLD QL SMEAR: SLIGHT
EOSINOPHIL # BLD AUTO: 0.1 10E3/UL (ref 0–0.7)
EOSINOPHIL NFR BLD AUTO: 2 %
ERYTHROCYTE [DISTWIDTH] IN BLOOD BY AUTOMATED COUNT: 19.7 % (ref 10–15)
FRAGMENTS BLD QL SMEAR: SLIGHT
HCG SERPL QL: NEGATIVE
HCT VFR BLD AUTO: 28.6 % (ref 35–47)
HGB BLD-MCNC: 8.3 G/DL (ref 11.7–15.7)
HGB C CRYSTALS: ABNORMAL
HOWELL-JOLLY BOD BLD QL SMEAR: ABNORMAL
IMM GRANULOCYTES # BLD: 0 10E3/UL
IMM GRANULOCYTES NFR BLD: 0 %
IRON BINDING CAPACITY (ROCHE): 392 UG/DL (ref 240–430)
IRON SATN MFR SERPL: 4 % (ref 15–46)
IRON SERPL-MCNC: 16 UG/DL (ref 37–145)
LYMPHOCYTES # BLD AUTO: 1.7 10E3/UL (ref 0.8–5.3)
LYMPHOCYTES NFR BLD AUTO: 23 %
MCH RBC QN AUTO: 19.5 PG (ref 26.5–33)
MCHC RBC AUTO-ENTMCNC: 29 G/DL (ref 31.5–36.5)
MCV RBC AUTO: 67 FL (ref 78–100)
MONOCYTES # BLD AUTO: 0.4 10E3/UL (ref 0–1.3)
MONOCYTES NFR BLD AUTO: 6 %
NEUTROPHILS # BLD AUTO: 5.1 10E3/UL (ref 1.6–8.3)
NEUTROPHILS NFR BLD AUTO: 69 %
NEUTS HYPERSEG BLD QL SMEAR: ABNORMAL
NRBC # BLD AUTO: 0 10E3/UL
NRBC BLD AUTO-RTO: 0 /100
PLAT MORPH BLD: ABNORMAL
PLATELET # BLD AUTO: 283 10E3/UL (ref 150–450)
POLYCHROMASIA BLD QL SMEAR: ABNORMAL
RBC # BLD AUTO: 4.26 10E6/UL (ref 3.8–5.2)
RBC AGGLUT BLD QL: ABNORMAL
RBC MORPH BLD: ABNORMAL
ROULEAUX BLD QL SMEAR: ABNORMAL
SICKLE CELLS BLD QL SMEAR: ABNORMAL
SMUDGE CELLS BLD QL SMEAR: ABNORMAL
SPHEROCYTES BLD QL SMEAR: ABNORMAL
STOMATOCYTES BLD QL SMEAR: ABNORMAL
TARGETS BLD QL SMEAR: ABNORMAL
TOXIC GRANULES BLD QL SMEAR: ABNORMAL
TRANSFERRIN SERPL-MCNC: 319 MG/DL (ref 200–360)
VARIANT LYMPHS BLD QL SMEAR: ABNORMAL
WBC # BLD AUTO: 7.5 10E3/UL (ref 4–11)

## 2023-12-07 PROCEDURE — 85025 COMPLETE CBC W/AUTO DIFF WBC: CPT | Performed by: EMERGENCY MEDICINE

## 2023-12-07 PROCEDURE — 96366 THER/PROPH/DIAG IV INF ADDON: CPT

## 2023-12-07 PROCEDURE — 83550 IRON BINDING TEST: CPT | Performed by: EMERGENCY MEDICINE

## 2023-12-07 PROCEDURE — 250N000013 HC RX MED GY IP 250 OP 250 PS 637: Performed by: EMERGENCY MEDICINE

## 2023-12-07 PROCEDURE — 84466 ASSAY OF TRANSFERRIN: CPT | Performed by: EMERGENCY MEDICINE

## 2023-12-07 PROCEDURE — 258N000003 HC RX IP 258 OP 636: Performed by: EMERGENCY MEDICINE

## 2023-12-07 PROCEDURE — 36415 COLL VENOUS BLD VENIPUNCTURE: CPT | Performed by: EMERGENCY MEDICINE

## 2023-12-07 PROCEDURE — 96375 TX/PRO/DX INJ NEW DRUG ADDON: CPT

## 2023-12-07 PROCEDURE — 99284 EMERGENCY DEPT VISIT MOD MDM: CPT | Mod: 25

## 2023-12-07 PROCEDURE — 84703 CHORIONIC GONADOTROPIN ASSAY: CPT | Performed by: EMERGENCY MEDICINE

## 2023-12-07 PROCEDURE — 250N000011 HC RX IP 250 OP 636: Mod: JZ | Performed by: EMERGENCY MEDICINE

## 2023-12-07 PROCEDURE — 96365 THER/PROPH/DIAG IV INF INIT: CPT

## 2023-12-07 RX ORDER — KETOROLAC TROMETHAMINE 15 MG/ML
15 INJECTION, SOLUTION INTRAMUSCULAR; INTRAVENOUS ONCE
Status: COMPLETED | OUTPATIENT
Start: 2023-12-07 | End: 2023-12-07

## 2023-12-07 RX ORDER — ONDANSETRON 4 MG/1
4 TABLET, ORALLY DISINTEGRATING ORAL ONCE
Status: COMPLETED | OUTPATIENT
Start: 2023-12-07 | End: 2023-12-07

## 2023-12-07 RX ORDER — ONDANSETRON 4 MG/1
4 TABLET, ORALLY DISINTEGRATING ORAL EVERY 6 HOURS PRN
Qty: 10 TABLET | Refills: 0 | Status: SHIPPED | OUTPATIENT
Start: 2023-12-07 | End: 2023-12-09

## 2023-12-07 RX ORDER — MAGNESIUM HYDROXIDE/ALUMINUM HYDROXICE/SIMETHICONE 120; 1200; 1200 MG/30ML; MG/30ML; MG/30ML
30 SUSPENSION ORAL ONCE
Status: COMPLETED | OUTPATIENT
Start: 2023-12-07 | End: 2023-12-07

## 2023-12-07 RX ADMIN — IRON SUCROSE 300 MG: 20 INJECTION, SOLUTION INTRAVENOUS at 12:06

## 2023-12-07 RX ADMIN — ONDANSETRON 4 MG: 4 TABLET, ORALLY DISINTEGRATING ORAL at 10:12

## 2023-12-07 RX ADMIN — ALUMINUM HYDROXIDE, MAGNESIUM HYDROXIDE, AND SIMETHICONE 30 ML: 200; 200; 20 SUSPENSION ORAL at 10:12

## 2023-12-07 RX ADMIN — KETOROLAC TROMETHAMINE 15 MG: 15 INJECTION, SOLUTION INTRAMUSCULAR; INTRAVENOUS at 12:05

## 2023-12-07 ASSESSMENT — ACTIVITIES OF DAILY LIVING (ADL)
ADLS_ACUITY_SCORE: 35
ADLS_ACUITY_SCORE: 35

## 2023-12-07 NOTE — DISCHARGE INSTRUCTIONS
We have given you an IV dose of iron as we saw you 2 years ago and you had the same problem.  If you do not tolerate iron orally please make an appointment with the above hematologist to discuss long-term management of iron deficiency anemia.  We are not concerned of acute blood loss we think the blood in your vomit may be related to retching.  If you develop black tarry stool or large-volume blood in your vomit return to the emergency room for reassessment.  Take Zofran when needed for nausea or vomiting.  Use antacid daily for gastritis.

## 2023-12-07 NOTE — ED NOTES
Pt discharged with written instructions and prescriptions for prilosec and zofran.  Pt verbalizes understanding of follow up with hem/onc for iron transfusion vs blood transfusions in the future.  No further questions at this time, pt was able to ambulate to the ED lobby with mom and without issue.

## 2023-12-07 NOTE — ED TRIAGE NOTES
Pt comes via EMS from home.  Pt had been vomiting for about 20 min when she saw some blood in her vomit.  She states her sister was vomitting yesterday as well.  Per EMS no vomit while she was with them.  Pt states she has low iron     Triage Assessment (Adult)       Row Name 12/07/23 0937          Triage Assessment    Airway WDL WDL        Respiratory WDL    Respiratory WDL WDL        Skin Circulation/Temperature WDL    Skin Circulation/Temperature WDL WDL        Peripheral/Neurovascular WDL    Peripheral Neurovascular WDL WDL        Cognitive/Neuro/Behavioral WDL    Cognitive/Neuro/Behavioral WDL WDL

## 2023-12-07 NOTE — ED PROVIDER NOTES
History     Chief Complaint:  Nausea & Vomiting       The history is provided by the patient.      Muna Husain is a 23 year old female with a history anemia who presents due to nausea and vomiting that began today. The patient reports that her sister is sick with similar symptoms that began the day before. She notes that her sister's symptoms have since resolved. This morning the patient woke up not feeling well. She experienced one episode of vomiting with one column of blood within it prompting her to present to the ED. Currently, she also reports a sore throat and slight abdominal pain from vomiting. The patient notes one episode of diarrhea but denies black or bloody stools.      Independent Historian:   None - Patient Only        Medications:    Iron    Past Medical History:    Anemia     Physical Exam   Patient Vitals for the past 24 hrs:   BP Pulse Resp SpO2   12/07/23 0934 109/89 75 16 99 %        Physical Exam  Vitals reviewed.   HENT:      Head: Normocephalic.      Nose: Nose normal.      Mouth/Throat:      Mouth: Mucous membranes are moist.   Eyes:      Pupils: Pupils are equal, round, and reactive to light.   Cardiovascular:      Rate and Rhythm: Normal rate.   Pulmonary:      Effort: Pulmonary effort is normal.   Abdominal:      General: Abdomen is flat. Bowel sounds are normal.   Skin:     Coloration: Skin is pale.   Neurological:      Mental Status: She is alert.           Emergency Department Course     Imaging:  No orders to display          Laboratory:  Labs Ordered and Resulted from Time of ED Arrival to Time of ED Departure   CBC WITH PLATELETS AND DIFFERENTIAL - Abnormal       Result Value    WBC Count 7.5      RBC Count 4.26      Hemoglobin 8.3 (*)     Hematocrit 28.6 (*)     MCV 67 (*)     MCH 19.5 (*)     MCHC 29.0 (*)     RDW 19.7 (*)     Platelet Count 283      % Neutrophils 69      % Lymphocytes 23      % Monocytes 6      % Eosinophils 2      % Basophils 0      % Immature Granulocytes  0      NRBCs per 100 WBC 0      Absolute Neutrophils 5.1      Absolute Lymphocytes 1.7      Absolute Monocytes 0.4      Absolute Eosinophils 0.1      Absolute Basophils 0.0      Absolute Immature Granulocytes 0.0      Absolute NRBCs 0.0     RBC AND PLATELET MORPHOLOGY - Abnormal    Platelet Assessment        Value: Automated Count Confirmed. Platelet morphology is normal.    Acanthocytes        Lex Rods        Basophilic Stippling        Bite Cells        Blister Cells        Garden Cells        Elliptocytes Slight (*)     Hgb C Crystals        Troy-Jolly Bodies        Hypersegmented Neutrophils        Polychromasia        RBC agglutination        RBC Fragments Slight (*)     Reactive Lymphocytes        Rouleaux        Sickle Cells        Smudge Cells        Spherocytes        Stomatocytes        Target Cells        Teardrop Cells        Toxic Neutrophils        RBC Morphology Confirmed RBC Indices     IRON AND IRON BINDING CAPACITY - Abnormal    Iron 16 (*)     Iron Binding Capacity 392      Iron Sat Index 4 (*)    HCG QUALITATIVE PREGNANCY - Normal    hCG Serum Qualitative Negative            Emergency Department Course & Assessments:         Interventions:  Medications - No data to display     Assessments:  0938 I obtained history and examined the patient as noted above.     Independent Interpretation (X-rays, CTs, rhythm strip):  None    Consultations/Discussion of Management or Tests:  None        Social Determinants of Health affecting care:   None    Disposition:  The patient was discharged to home.     Impression & Plan      Medical Decision Making:  Patient presents with a single episode of hematemesis small amount suspect Nati-Cole tear.  Patient has a history of prior iron deficiency anemia seen by me a number years ago with abnormal lab work but states she does not take iron as it makes her sick.  Dose of IV iron was given.  IV fluids and lab work unremarkable.  I suspect advised care.  Offered  reassurance and nausea meds and follow-up with hematology to consider recurrent IV Venofer infusions due to iron deficiency anemia.  Care is patient was discharged in stable condition      Diagnosis:    ICD-10-CM    1. Iron deficiency anemia, unspecified iron deficiency anemia type  D50.9       2. Hematemesis with nausea  K92.0            Discharge Medications:  Discharge Medication List as of 12/7/2023  2:07 PM        START taking these medications    Details   omeprazole (PRILOSEC) 20 MG DR capsule Take 1 capsule (20 mg) by mouth daily for 30 days, Disp-30 capsule, R-0, Local Print      ondansetron (ZOFRAN ODT) 4 MG ODT tab Take 1 tablet (4 mg) by mouth every 6 hours as needed for nausea or vomiting, Disp-10 tablet, R-0, Local Print            Scribe Disclosure:  I, Kiersten Agarwal, am serving as a scribe at 10:02 AM on 12/7/2023 to document services personally performed by Dejuan Almazan MD based on my observations and the provider's statements to me.     12/7/2023   Dejuan Almazan MD Goodman, Brian Samuel, MD  12/13/23 0237

## 2023-12-09 ENCOUNTER — NURSE TRIAGE (OUTPATIENT)
Dept: NURSING | Facility: CLINIC | Age: 23
End: 2023-12-09
Payer: COMMERCIAL

## 2023-12-09 DIAGNOSIS — R11.10 VOMITING: Primary | ICD-10-CM

## 2023-12-09 RX ORDER — ONDANSETRON 4 MG/1
4 TABLET, ORALLY DISINTEGRATING ORAL EVERY 6 HOURS PRN
Qty: 10 TABLET | Refills: 0 | Status: SHIPPED | OUTPATIENT
Start: 2023-12-09 | End: 2024-03-12

## 2023-12-09 NOTE — TELEPHONE ENCOUNTER
Patient reports Rx not at pharmacy, order remains valid so I resent the order.   Reason for Disposition    [1] Follow-up call to recent contact AND [2] information only call, no triage required    Protocols used: Information Only Call - No Triage-A-

## 2024-03-12 ENCOUNTER — HOSPITAL ENCOUNTER (EMERGENCY)
Facility: CLINIC | Age: 24
Discharge: HOME | End: 2024-03-12
Attending: EMERGENCY MEDICINE | Admitting: EMERGENCY MEDICINE
Payer: COMMERCIAL

## 2024-03-12 ENCOUNTER — APPOINTMENT (OUTPATIENT)
Dept: CT IMAGING | Facility: CLINIC | Age: 24
End: 2024-03-12
Attending: EMERGENCY MEDICINE
Payer: COMMERCIAL

## 2024-03-12 VITALS
TEMPERATURE: 98 F | DIASTOLIC BLOOD PRESSURE: 71 MMHG | WEIGHT: 117.5 LBS | BODY MASS INDEX: 19.58 KG/M2 | SYSTOLIC BLOOD PRESSURE: 109 MMHG | HEIGHT: 65 IN | OXYGEN SATURATION: 100 % | RESPIRATION RATE: 14 BRPM | HEART RATE: 64 BPM

## 2024-03-12 DIAGNOSIS — K52.9 GASTROENTERITIS: ICD-10-CM

## 2024-03-12 LAB
ALBUMIN SERPL BCG-MCNC: 4.7 G/DL (ref 3.5–5.2)
ALBUMIN UR-MCNC: NEGATIVE MG/DL
ALP SERPL-CCNC: 57 U/L (ref 40–150)
ALT SERPL W P-5'-P-CCNC: 14 U/L (ref 0–50)
ANION GAP SERPL CALCULATED.3IONS-SCNC: 14 MMOL/L (ref 7–15)
APPEARANCE UR: CLEAR
AST SERPL W P-5'-P-CCNC: 20 U/L (ref 0–45)
BASOPHILS # BLD AUTO: 0 10E3/UL (ref 0–0.2)
BASOPHILS NFR BLD AUTO: 1 %
BILIRUB SERPL-MCNC: 0.3 MG/DL
BILIRUB UR QL STRIP: NEGATIVE
BUN SERPL-MCNC: 4.4 MG/DL (ref 6–20)
CALCIUM SERPL-MCNC: 9.5 MG/DL (ref 8.6–10)
CHLORIDE SERPL-SCNC: 101 MMOL/L (ref 98–107)
COLOR UR AUTO: ABNORMAL
CREAT SERPL-MCNC: 0.71 MG/DL (ref 0.51–0.95)
DEPRECATED HCO3 PLAS-SCNC: 23 MMOL/L (ref 22–29)
EGFRCR SERPLBLD CKD-EPI 2021: >90 ML/MIN/1.73M2
EOSINOPHIL # BLD AUTO: 0.1 10E3/UL (ref 0–0.7)
EOSINOPHIL NFR BLD AUTO: 2 %
ERYTHROCYTE [DISTWIDTH] IN BLOOD BY AUTOMATED COUNT: 19.1 % (ref 10–15)
FLUAV RNA SPEC QL NAA+PROBE: NEGATIVE
FLUBV RNA RESP QL NAA+PROBE: NEGATIVE
GLUCOSE SERPL-MCNC: 105 MG/DL (ref 70–99)
GLUCOSE UR STRIP-MCNC: NEGATIVE MG/DL
HCG UR QL: NEGATIVE
HCT VFR BLD AUTO: 40.6 % (ref 35–47)
HGB BLD-MCNC: 13.9 G/DL (ref 11.7–15.7)
HGB UR QL STRIP: ABNORMAL
IMM GRANULOCYTES # BLD: 0 10E3/UL
IMM GRANULOCYTES NFR BLD: 0 %
KETONES UR STRIP-MCNC: NEGATIVE MG/DL
LEUKOCYTE ESTERASE UR QL STRIP: NEGATIVE
LIPASE SERPL-CCNC: 26 U/L (ref 13–60)
LYMPHOCYTES # BLD AUTO: 1.9 10E3/UL (ref 0.8–5.3)
LYMPHOCYTES NFR BLD AUTO: 38 %
MCH RBC QN AUTO: 29.3 PG (ref 26.5–33)
MCHC RBC AUTO-ENTMCNC: 34.2 G/DL (ref 31.5–36.5)
MCV RBC AUTO: 86 FL (ref 78–100)
MONOCYTES # BLD AUTO: 0.4 10E3/UL (ref 0–1.3)
MONOCYTES NFR BLD AUTO: 8 %
MUCOUS THREADS #/AREA URNS LPF: PRESENT /LPF
NEUTROPHILS # BLD AUTO: 2.6 10E3/UL (ref 1.6–8.3)
NEUTROPHILS NFR BLD AUTO: 51 %
NITRATE UR QL: NEGATIVE
NRBC # BLD AUTO: 0 10E3/UL
NRBC BLD AUTO-RTO: 0 /100
PH UR STRIP: 5.5 [PH] (ref 5–7)
PLATELET # BLD AUTO: 181 10E3/UL (ref 150–450)
POTASSIUM SERPL-SCNC: 3.7 MMOL/L (ref 3.4–5.3)
PROT SERPL-MCNC: 8.3 G/DL (ref 6.4–8.3)
RBC # BLD AUTO: 4.75 10E6/UL (ref 3.8–5.2)
RBC URINE: 6 /HPF
RSV RNA SPEC NAA+PROBE: NEGATIVE
SARS-COV-2 RNA RESP QL NAA+PROBE: NEGATIVE
SODIUM SERPL-SCNC: 138 MMOL/L (ref 135–145)
SP GR UR STRIP: 1.01 (ref 1–1.03)
UROBILINOGEN UR STRIP-MCNC: NORMAL MG/DL
WBC # BLD AUTO: 5.1 10E3/UL (ref 4–11)
WBC URINE: 1 /HPF

## 2024-03-12 PROCEDURE — 74177 CT ABD & PELVIS W/CONTRAST: CPT

## 2024-03-12 PROCEDURE — 81025 URINE PREGNANCY TEST: CPT | Performed by: EMERGENCY MEDICINE

## 2024-03-12 PROCEDURE — 96360 HYDRATION IV INFUSION INIT: CPT | Mod: 59

## 2024-03-12 PROCEDURE — 81001 URINALYSIS AUTO W/SCOPE: CPT | Performed by: EMERGENCY MEDICINE

## 2024-03-12 PROCEDURE — 258N000003 HC RX IP 258 OP 636: Performed by: EMERGENCY MEDICINE

## 2024-03-12 PROCEDURE — 87637 SARSCOV2&INF A&B&RSV AMP PRB: CPT | Performed by: EMERGENCY MEDICINE

## 2024-03-12 PROCEDURE — 250N000011 HC RX IP 250 OP 636: Performed by: EMERGENCY MEDICINE

## 2024-03-12 PROCEDURE — 99285 EMERGENCY DEPT VISIT HI MDM: CPT | Mod: 25

## 2024-03-12 PROCEDURE — 85025 COMPLETE CBC W/AUTO DIFF WBC: CPT | Performed by: EMERGENCY MEDICINE

## 2024-03-12 PROCEDURE — 36415 COLL VENOUS BLD VENIPUNCTURE: CPT | Performed by: EMERGENCY MEDICINE

## 2024-03-12 PROCEDURE — 80053 COMPREHEN METABOLIC PANEL: CPT | Performed by: EMERGENCY MEDICINE

## 2024-03-12 PROCEDURE — 83690 ASSAY OF LIPASE: CPT | Performed by: EMERGENCY MEDICINE

## 2024-03-12 PROCEDURE — 250N000013 HC RX MED GY IP 250 OP 250 PS 637: Performed by: EMERGENCY MEDICINE

## 2024-03-12 RX ORDER — ONDANSETRON 4 MG/1
4 TABLET, ORALLY DISINTEGRATING ORAL EVERY 8 HOURS PRN
Qty: 10 TABLET | Refills: 0 | Status: SHIPPED | OUTPATIENT
Start: 2024-03-12 | End: 2024-03-15

## 2024-03-12 RX ORDER — ONDANSETRON 4 MG/1
4 TABLET, ORALLY DISINTEGRATING ORAL ONCE
Status: COMPLETED | OUTPATIENT
Start: 2024-03-12 | End: 2024-03-12

## 2024-03-12 RX ORDER — IOPAMIDOL 755 MG/ML
500 INJECTION, SOLUTION INTRAVASCULAR ONCE
Status: COMPLETED | OUTPATIENT
Start: 2024-03-12 | End: 2024-03-12

## 2024-03-12 RX ORDER — ACETAMINOPHEN 325 MG/1
975 TABLET ORAL ONCE
Status: COMPLETED | OUTPATIENT
Start: 2024-03-12 | End: 2024-03-12

## 2024-03-12 RX ADMIN — IOPAMIDOL 59 ML: 755 INJECTION, SOLUTION INTRAVENOUS at 04:00

## 2024-03-12 RX ADMIN — SODIUM CHLORIDE 1000 ML: 9 INJECTION, SOLUTION INTRAVENOUS at 03:33

## 2024-03-12 RX ADMIN — ACETAMINOPHEN 975 MG: 325 TABLET, FILM COATED ORAL at 02:09

## 2024-03-12 RX ADMIN — ONDANSETRON 4 MG: 4 TABLET, ORALLY DISINTEGRATING ORAL at 01:41

## 2024-03-12 ASSESSMENT — ACTIVITIES OF DAILY LIVING (ADL)
ADLS_ACUITY_SCORE: 35
ADLS_ACUITY_SCORE: 33

## 2024-03-12 ASSESSMENT — COLUMBIA-SUICIDE SEVERITY RATING SCALE - C-SSRS
1. IN THE PAST MONTH, HAVE YOU WISHED YOU WERE DEAD OR WISHED YOU COULD GO TO SLEEP AND NOT WAKE UP?: NO
2. HAVE YOU ACTUALLY HAD ANY THOUGHTS OF KILLING YOURSELF IN THE PAST MONTH?: NO
6. HAVE YOU EVER DONE ANYTHING, STARTED TO DO ANYTHING, OR PREPARED TO DO ANYTHING TO END YOUR LIFE?: NO

## 2024-03-12 NOTE — ED TRIAGE NOTES
Pt arrives for generalized abdominal pain starting today, feels like intermittent cramping. Nausea/vomiting/diarrhea. Additionally, pt is on her period but states this pain is different than normal cramps. Denies other cold symptoms, pt reports she also doesn't drink milk very much but she had a glass of milk last night. Denies urinary symptoms.

## 2024-03-12 NOTE — ED PROVIDER NOTES
"  History     Chief Complaint:  Nausea, Vomiting, & Diarrhea and Abdominal Pain    The history is provided by the patient.      Muna Husain is a 24 year old female presenting with nausea, vomiting, diarrhea, and abdominal pain. She woke up and attributed her symptoms to cramping. Muna endorses an increase in urination and bowel movements. She has been experiencing vomiting and decreased appetite. Anything she consumes, she vomits back up. She took Tylenol prior to presentation, but note she vomited afterwards. She was given Zofran and feels improved. No ill contacts. No recent travel or antibiotics. Of note, patient drank tea with milk in it that she believes irritated her stomach.     Independent Historian:   Patient's mother and sister are present and bedside and corroborate the above.     Medications:    Omeprazole   Ondanstron     Past Medical History:    Heavy menstrual bleeding  Anemia     Physical Exam   Patient Vitals for the past 24 hrs:   BP Temp Temp src Pulse Resp SpO2 Height Weight   03/12/24 0444 109/71 -- -- 64 14 100 % -- --   03/12/24 0137 129/75 98  F (36.7  C) Temporal 80 20 99 % 1.651 m (5' 5\") 53.3 kg (117 lb 8.1 oz)      Physical Exam  General: Patient is awake, alert and interactive when I enter the room. Appears uncomfortable  Head: The scalp, face, and head appear normal  Eyes: Conjunctivae and sclerae are normal  Neck: Normal range of motion.   CV: Regular rate.   Resp:  No respiratory distress.   GI: periumbilical  tenderness but abdomen is soft, no rigidity. No evidence of pulsatile mass. No fluid waves or evidence of ascites. No distension. No hernias or bruising are noted in detailed exam. No CVA tenderness.    MS: Normal tone.   Skin: Normal capillary refill noted  Neuro: Speech is normal and fluent. Face is symmetric. Moving all extremities.   Psych:  Normal affect.  Appropriate interactions.    Emergency Department Course     Imaging:  CT Abdomen Pelvis w Contrast   Final Result "   IMPRESSION:    1.  Diffusely thickened urinary bladder with trace pericystic inflammatory stranding and trace free fluid in the dependent pelvis, findings suspicious for cystitis. Recommend correlation with urinalysis.         Laboratory:  Labs Ordered and Resulted from Time of ED Arrival to Time of ED Departure   ROUTINE UA WITH MICROSCOPIC REFLEX TO CULTURE - Abnormal       Result Value    Color Urine Light Yellow      Appearance Urine Clear      Glucose Urine Negative      Bilirubin Urine Negative      Ketones Urine Negative      Specific Gravity Urine 1.009      Blood Urine Small (*)     pH Urine 5.5      Protein Albumin Urine Negative      Urobilinogen Urine Normal      Nitrite Urine Negative      Leukocyte Esterase Urine Negative      Mucus Urine Present (*)     RBC Urine 6 (*)     WBC Urine 1     COMPREHENSIVE METABOLIC PANEL - Abnormal    Sodium 138      Potassium 3.7      Carbon Dioxide (CO2) 23      Anion Gap 14      Urea Nitrogen 4.4 (*)     Creatinine 0.71      GFR Estimate >90      Calcium 9.5      Chloride 101      Glucose 105 (*)     Alkaline Phosphatase 57      AST 20      ALT 14      Protein Total 8.3      Albumin 4.7      Bilirubin Total 0.3     CBC WITH PLATELETS AND DIFFERENTIAL - Abnormal    WBC Count 5.1      RBC Count 4.75      Hemoglobin 13.9      Hematocrit 40.6      MCV 86      MCH 29.3      MCHC 34.2      RDW 19.1 (*)     Platelet Count 181      % Neutrophils 51      % Lymphocytes 38      % Monocytes 8      % Eosinophils 2      % Basophils 1      % Immature Granulocytes 0      NRBCs per 100 WBC 0      Absolute Neutrophils 2.6      Absolute Lymphocytes 1.9      Absolute Monocytes 0.4      Absolute Eosinophils 0.1      Absolute Basophils 0.0      Absolute Immature Granulocytes 0.0      Absolute NRBCs 0.0     INFLUENZA A/B, RSV, & SARS-COV2 PCR - Normal    Influenza A PCR Negative      Influenza B PCR Negative      RSV PCR Negative      SARS CoV2 PCR Negative     HCG QUALITATIVE URINE -  Normal    hCG Urine Qualitative Negative     LIPASE - Normal    Lipase 26       Emergency Department Course & Assessments:    Interventions:  Medications   ondansetron (ZOFRAN ODT) ODT tab 4 mg (4 mg Oral $Given 3/12/24 0141)   acetaminophen (TYLENOL) tablet 975 mg (975 mg Oral $Given 3/12/24 0209)   sodium chloride 0.9% BOLUS 1,000 mL (0 mLs Intravenous Stopped 3/12/24 0435)   iopamidol (ISOVUE-370) solution 500 mL (59 mLs Intravenous $Given 3/12/24 0400)   sodium chloride (PF) 0.9% PF flush 100 mL (55 mLs Intravenous $Given 3/12/24 0401)      Independent Interpretation (X-rays, CTs, rhythm strip):  No imaging completed    Assessments/Consultations/Discussion of Management or Tests:  ED Course as of 03/12/24 0704   Tue Mar 12, 2024   0256 I obtained the history and examined the patient as noted above.      0425 I rechecked and updated the patient.        Social Determinants of Health affecting care:   None    Disposition:  The patient was discharged to home.     Impression & Plan      Medical Decision Making:  Muna Husain is a 24 year old female who presents with acute abdominal pain, nausea, vomiting and diarrhea. I considered a broad differential diagnosis for this patient including viral gastroenteritis, bacterial infection of the large intestine (salmonella, shigella, campylobacter, e coli, etc), bowel obstruction, intra-abdominal infection such as colitis, food poisoning, cholecystitis, UTI, pyelonephritis, appendicitis, etc.  Symptoms are improved after IV fluids and symptomatic treatment.  The patient is not pregnant.  There is no evidence of urinary tract infection. There has been no travel or antibiotic exposure to suggest more concerning cause of diarrhea, and there has been no hematemesis or BRBPR/melena.  Vital signs have remained normal and stable throughout the ED course. CT scan negative. I believe she is safe for discharge in improved condition at this time with strict return precautions for  recurrent vomiting, pain, fever or any other concerning symptoms. Patient tolerated PO.     Diagnosis:    ICD-10-CM    1. Gastroenteritis  K52.9          Discharge Medications:  Discharge Medication List as of 3/12/2024  4:35 AM         Scribe Disclosure:  IKat, am serving as a scribe at 2:56 AM on 3/12/2024 to document services personally performed by Anthony Ortez MD based on my observations and the provider's statements to me.  3/12/2024   Anthony Ortez MD Battista, Christopher Joseph, MD  03/12/24 0705

## 2024-03-16 ENCOUNTER — HOSPITAL ENCOUNTER (EMERGENCY)
Facility: CLINIC | Age: 24
Discharge: HOME | End: 2024-03-16
Attending: EMERGENCY MEDICINE | Admitting: EMERGENCY MEDICINE
Payer: COMMERCIAL

## 2024-03-16 VITALS
BODY MASS INDEX: 19.49 KG/M2 | WEIGHT: 117 LBS | TEMPERATURE: 98.7 F | RESPIRATION RATE: 18 BRPM | HEIGHT: 65 IN | HEART RATE: 87 BPM | SYSTOLIC BLOOD PRESSURE: 114 MMHG | OXYGEN SATURATION: 100 % | DIASTOLIC BLOOD PRESSURE: 73 MMHG

## 2024-03-16 DIAGNOSIS — N92.1 MENOMETRORRHAGIA: ICD-10-CM

## 2024-03-16 DIAGNOSIS — R11.2 NAUSEA AND VOMITING, UNSPECIFIED VOMITING TYPE: ICD-10-CM

## 2024-03-16 LAB
ANION GAP SERPL CALCULATED.3IONS-SCNC: 13 MMOL/L (ref 7–15)
BASOPHILS # BLD AUTO: 0 10E3/UL (ref 0–0.2)
BASOPHILS NFR BLD AUTO: 1 %
BUN SERPL-MCNC: 4.6 MG/DL (ref 6–20)
CALCIUM SERPL-MCNC: 9.4 MG/DL (ref 8.6–10)
CHLORIDE SERPL-SCNC: 101 MMOL/L (ref 98–107)
CREAT SERPL-MCNC: 0.81 MG/DL (ref 0.51–0.95)
DEPRECATED HCO3 PLAS-SCNC: 24 MMOL/L (ref 22–29)
EGFRCR SERPLBLD CKD-EPI 2021: >90 ML/MIN/1.73M2
EOSINOPHIL # BLD AUTO: 0.1 10E3/UL (ref 0–0.7)
EOSINOPHIL NFR BLD AUTO: 2 %
ERYTHROCYTE [DISTWIDTH] IN BLOOD BY AUTOMATED COUNT: 18.1 % (ref 10–15)
GLUCOSE SERPL-MCNC: 90 MG/DL (ref 70–99)
HCG SERPL QL: NEGATIVE
HCT VFR BLD AUTO: 43.7 % (ref 35–47)
HGB BLD-MCNC: 14.9 G/DL (ref 11.7–15.7)
HOLD SPECIMEN: NORMAL
IMM GRANULOCYTES # BLD: 0 10E3/UL
IMM GRANULOCYTES NFR BLD: 0 %
LYMPHOCYTES # BLD AUTO: 1.5 10E3/UL (ref 0.8–5.3)
LYMPHOCYTES NFR BLD AUTO: 35 %
MCH RBC QN AUTO: 29.2 PG (ref 26.5–33)
MCHC RBC AUTO-ENTMCNC: 34.1 G/DL (ref 31.5–36.5)
MCV RBC AUTO: 86 FL (ref 78–100)
MONOCYTES # BLD AUTO: 0.5 10E3/UL (ref 0–1.3)
MONOCYTES NFR BLD AUTO: 12 %
NEUTROPHILS # BLD AUTO: 2.1 10E3/UL (ref 1.6–8.3)
NEUTROPHILS NFR BLD AUTO: 50 %
NRBC # BLD AUTO: 0 10E3/UL
NRBC BLD AUTO-RTO: 0 /100
PLATELET # BLD AUTO: 172 10E3/UL (ref 150–450)
POTASSIUM SERPL-SCNC: 3.5 MMOL/L (ref 3.4–5.3)
RBC # BLD AUTO: 5.11 10E6/UL (ref 3.8–5.2)
SODIUM SERPL-SCNC: 138 MMOL/L (ref 135–145)
WBC # BLD AUTO: 4.2 10E3/UL (ref 4–11)

## 2024-03-16 PROCEDURE — 96361 HYDRATE IV INFUSION ADD-ON: CPT

## 2024-03-16 PROCEDURE — 96375 TX/PRO/DX INJ NEW DRUG ADDON: CPT

## 2024-03-16 PROCEDURE — 96374 THER/PROPH/DIAG INJ IV PUSH: CPT

## 2024-03-16 PROCEDURE — 99284 EMERGENCY DEPT VISIT MOD MDM: CPT | Mod: 25

## 2024-03-16 PROCEDURE — 80048 BASIC METABOLIC PNL TOTAL CA: CPT | Performed by: EMERGENCY MEDICINE

## 2024-03-16 PROCEDURE — 258N000003 HC RX IP 258 OP 636: Performed by: EMERGENCY MEDICINE

## 2024-03-16 PROCEDURE — 85025 COMPLETE CBC W/AUTO DIFF WBC: CPT | Performed by: EMERGENCY MEDICINE

## 2024-03-16 PROCEDURE — 84703 CHORIONIC GONADOTROPIN ASSAY: CPT | Performed by: EMERGENCY MEDICINE

## 2024-03-16 PROCEDURE — 36415 COLL VENOUS BLD VENIPUNCTURE: CPT | Performed by: EMERGENCY MEDICINE

## 2024-03-16 PROCEDURE — 250N000011 HC RX IP 250 OP 636: Mod: JZ | Performed by: EMERGENCY MEDICINE

## 2024-03-16 RX ORDER — METOCLOPRAMIDE HYDROCHLORIDE 5 MG/ML
10 INJECTION INTRAMUSCULAR; INTRAVENOUS ONCE
Status: COMPLETED | OUTPATIENT
Start: 2024-03-16 | End: 2024-03-16

## 2024-03-16 RX ORDER — DIPHENHYDRAMINE HYDROCHLORIDE 50 MG/ML
25 INJECTION INTRAMUSCULAR; INTRAVENOUS ONCE
Status: COMPLETED | OUTPATIENT
Start: 2024-03-16 | End: 2024-03-16

## 2024-03-16 RX ORDER — METOCLOPRAMIDE 5 MG/1
5 TABLET ORAL 3 TIMES DAILY PRN
Qty: 10 TABLET | Refills: 0 | Status: SHIPPED | OUTPATIENT
Start: 2024-03-16

## 2024-03-16 RX ADMIN — METOCLOPRAMIDE HYDROCHLORIDE 10 MG: 5 INJECTION INTRAMUSCULAR; INTRAVENOUS at 02:35

## 2024-03-16 RX ADMIN — DIPHENHYDRAMINE HYDROCHLORIDE 25 MG: 50 INJECTION INTRAMUSCULAR; INTRAVENOUS at 02:33

## 2024-03-16 RX ADMIN — SODIUM CHLORIDE 1000 ML: 9 INJECTION, SOLUTION INTRAVENOUS at 02:27

## 2024-03-16 ASSESSMENT — ACTIVITIES OF DAILY LIVING (ADL)
ADLS_ACUITY_SCORE: 35
ADLS_ACUITY_SCORE: 33

## 2024-03-16 ASSESSMENT — COLUMBIA-SUICIDE SEVERITY RATING SCALE - C-SSRS
2. HAVE YOU ACTUALLY HAD ANY THOUGHTS OF KILLING YOURSELF IN THE PAST MONTH?: NO
6. HAVE YOU EVER DONE ANYTHING, STARTED TO DO ANYTHING, OR PREPARED TO DO ANYTHING TO END YOUR LIFE?: NO
1. IN THE PAST MONTH, HAVE YOU WISHED YOU WERE DEAD OR WISHED YOU COULD GO TO SLEEP AND NOT WAKE UP?: NO

## 2024-03-16 NOTE — ED PROVIDER NOTES
History     Chief Complaint:  Vomiting and diarrhea       HPI   Muna Husain is a 24 year old female with history of menometrorrhagia, related anemia, and significant episodes of vomiting associated with her menses, who presents for evaluation of vomiting and diarrhea.  The patient states that she started her period 5 days ago and has had frequent vomiting and occasional diarrhea since.  She was seen in this ED 3/12/2024 with abdominal pain, vomiting, diarrhea, had a reassuring CT abdomen pelvis.  She states that she had vomiting this morning and 1 prior to arrival, prompting her evaluation today.  She denies abdominal pain at this time.  She denies dysuria, frequency, hematuria, or other concerns.        Independent Historian:   none    Review of External Notes: Reviewed 3/14/2024 office visit    ROS:  Review of Systems    Allergies:  No Known Allergies     Medications:    metoclopramide (REGLAN) 5 MG tablet  IRON-VIT C-VIT B12-FOLIC ACID (GENTLE IRON) 28-60-0.008-0.4 MG CAPS  norgestrel-ethinyl estradiol (LO/OVRAL) 0.3-30 MG-MCG tablet  omeprazole (PRILOSEC) 20 MG DR capsule        Past History:    Past Medical History:   Diagnosis Date    Heavy menstrual bleeding          Physical Exam     Patient Vitals for the past 24 hrs:   BP Temp Temp src Pulse Resp SpO2   05/12/23 0247 113/73 97.8  F (36.6  C) Temporal 95 20 98 %        Physical Exam  Constitutional: Alert, attentive  HENT:    Nose: Nose normal.    Mouth/Throat: Oropharynx is clear, mucous membranes are moist   Eyes: EOM are normal.   CV: regular rate and rhythm; no murmurs, rubs or gallups  Chest: Effort normal and breath sounds normal.   GI:  There is no tenderness. No distension. Normal bowel sounds  MSK: Normal range of motion.   Neurological: Alert, attentive  Skin: Skin is warm and dry.      Emergency Department Course       Results for orders placed or performed during the hospital encounter of 03/16/24   [place] Draw     Status: None (In  process)    Narrative    The following orders were created for panel order [place] Draw.  Procedure                               Abnormality         Status                     ---------                               -----------         ------                     Extra Blue Top Tube[[id number]]                              In process                 Extra Red Top Tube[[id number]]                               In process                 Extra Green Top (Lithium...[[id number]]                      In process                 Extra Purple Top Tube[[id number]]                            In process                   Please view results for these tests on the individual orders.   Basic metabolic panel (BMP)     Status: Abnormal   Result Value Ref Range    Sodium 138 135 - 145 mmol/L    Potassium 3.5 3.4 - 5.3 mmol/L    Chloride 101 98 - 107 mmol/L    Carbon Dioxide (CO2) 24 22 - 29 mmol/L    Anion Gap 13 7 - 15 mmol/L    Urea Nitrogen 4.6 (L) 6.0 - 20.0 mg/dL    Creatinine 0.81 0.51 - 0.95 mg/dL    GFR Estimate >90 >60 mL/min/1.73m2    Calcium 9.4 8.6 - 10.0 mg/dL    Glucose 90 70 - 99 mg/dL   CBC with platelets and differential     Status: Abnormal   Result Value Ref Range    WBC Count 4.2 4.0 - 11.0 10e3/uL    RBC Count 5.11 3.80 - 5.20 10e6/uL    Hemoglobin 14.9 11.7 - 15.7 g/dL    Hematocrit 43.7 35.0 - 47.0 %    MCV 86 78 - 100 fL    MCH 29.2 26.5 - 33.0 pg    MCHC 34.1 31.5 - 36.5 g/dL    RDW 18.1 (H) 10.0 - 15.0 %    Platelet Count 172 150 - 450 10e3/uL    % Neutrophils 50 %    % Lymphocytes 35 %    % Monocytes 12 %    % Eosinophils 2 %    % Basophils 1 %    % Immature Granulocytes 0 %    NRBCs per 100 WBC 0 <1 /100    Absolute Neutrophils 2.1 1.6 - 8.3 10e3/uL    Absolute Lymphocytes 1.5 0.8 - 5.3 10e3/uL    Absolute Monocytes 0.5 0.0 - 1.3 10e3/uL    Absolute Eosinophils 0.1 0.0 - 0.7 10e3/uL    Absolute Basophils 0.0 0.0 - 0.2 10e3/uL    Absolute Immature Granulocytes 0.0 <=0.4 10e3/uL    Absolute NRBCs 0.0 10e3/uL    HCG QUALitative pregnancy (blood)     Status: Normal   Result Value Ref Range    hCG Serum Qualitative Negative Negative   CBC + differential     Status: Abnormal    Narrative    The following orders were created for panel order CBC + differential.  Procedure                               Abnormality         Status                     ---------                               -----------         ------                     CBC with platelets and d...[[id number]]  Abnormal            Final result                 Please view results for these tests on the individual orders.       Emergency Department Course & Assessments:             Interventions:  Medications   diphenhydrAMINE (BENADRYL) injection 25 mg (25 mg Intravenous $Given 3/16/24 0233)   metoclopramide (REGLAN) injection 10 mg (10 mg Intravenous $Given 3/16/24 0235)   sodium chloride 0.9% BOLUS 1,000 mL (0 mLs Intravenous Stopped 3/16/24 0344)         Disposition:  The patient was discharged to home.     Impression & Plan        Medical Decision Making:  This a pleasant 24-year-old female with history of menometrorrhagia and vomiting associated with menses who presents for evaluation of vomiting.  She had an episode of vomiting earlier today and immediately prior to presentation, but is not actively vomiting in the department.  She is well-hydrated, well-appearing, and has normal vital signs.  She has a benign abdominal exam.  She underwent CT scan which was unremarkable recently.  There is no indication for advanced imaging at this time.  She feels improved after supportive cares and labs are reassuring against acute kidney injury, significant electrolyte abnormality, etc.  Given the GI symptoms are cyclical, associated with her menstrual cycle, I recommended OB/GYN follow-up.  At some point 2 years ago, the patient was on OCPs for menometrorrhagia.  Perhaps these would be helpful for her.  She is safe for discharge at this time with the above follow-up in 1  week and return precautions for intractable vomiting, abdominal pain, or any other concerns.    Diagnosis:  Visit Diagnosis, Associated Orders, and Comments     ICD-10-CM    1. Nausea and vomiting, unspecified vomiting type  R11.2       2. Menometrorrhagia  N92.1              Suhas Hunt MD  03/16/24 0347

## 2024-03-16 NOTE — ED TRIAGE NOTES
Pt seen in the ER on 3/12 and UC on 3/14. Pt has been vomiting and diarrhea every day since the 12th. Pt reports the vomiting and diarrhea is only in the morning. Pt reports she has been able to drink fluids, but unable to eat anything. Pt also c/o headache that started on the 15th. Has taken tylenol last at 1600

## 2024-06-12 ENCOUNTER — APPOINTMENT (OUTPATIENT)
Dept: GENERAL RADIOLOGY | Facility: CLINIC | Age: 24
End: 2024-06-12
Attending: EMERGENCY MEDICINE
Payer: COMMERCIAL

## 2024-06-12 ENCOUNTER — HOSPITAL ENCOUNTER (EMERGENCY)
Facility: CLINIC | Age: 24
Discharge: HOME | End: 2024-06-12
Attending: EMERGENCY MEDICINE | Admitting: EMERGENCY MEDICINE
Payer: COMMERCIAL

## 2024-06-12 VITALS
TEMPERATURE: 98.2 F | RESPIRATION RATE: 18 BRPM | OXYGEN SATURATION: 100 % | SYSTOLIC BLOOD PRESSURE: 104 MMHG | HEART RATE: 75 BPM | HEIGHT: 65 IN | WEIGHT: 121.25 LBS | BODY MASS INDEX: 20.2 KG/M2 | DIASTOLIC BLOOD PRESSURE: 74 MMHG

## 2024-06-12 DIAGNOSIS — R07.9 CHEST PAIN, UNSPECIFIED TYPE: ICD-10-CM

## 2024-06-12 LAB
ANION GAP SERPL CALCULATED.3IONS-SCNC: 13 MMOL/L (ref 7–15)
ATRIAL RATE - MUSE: 73 BPM
BASOPHILS # BLD AUTO: 0 10E3/UL (ref 0–0.2)
BASOPHILS NFR BLD AUTO: 1 %
BUN SERPL-MCNC: 7.8 MG/DL (ref 6–20)
CALCIUM SERPL-MCNC: 9.2 MG/DL (ref 8.6–10)
CHLORIDE SERPL-SCNC: 103 MMOL/L (ref 98–107)
CREAT SERPL-MCNC: 0.75 MG/DL (ref 0.51–0.95)
DEPRECATED HCO3 PLAS-SCNC: 23 MMOL/L (ref 22–29)
DIASTOLIC BLOOD PRESSURE - MUSE: NORMAL MMHG
EGFRCR SERPLBLD CKD-EPI 2021: >90 ML/MIN/1.73M2
EOSINOPHIL # BLD AUTO: 0.1 10E3/UL (ref 0–0.7)
EOSINOPHIL NFR BLD AUTO: 2 %
ERYTHROCYTE [DISTWIDTH] IN BLOOD BY AUTOMATED COUNT: 12.3 % (ref 10–15)
FLUAV RNA SPEC QL NAA+PROBE: NEGATIVE
FLUBV RNA RESP QL NAA+PROBE: NEGATIVE
GLUCOSE SERPL-MCNC: 110 MG/DL (ref 70–99)
HCT VFR BLD AUTO: 38.3 % (ref 35–47)
HGB BLD-MCNC: 13.5 G/DL (ref 11.7–15.7)
IMM GRANULOCYTES # BLD: 0 10E3/UL
IMM GRANULOCYTES NFR BLD: 0 %
INTERPRETATION ECG - MUSE: NORMAL
LYMPHOCYTES # BLD AUTO: 3.9 10E3/UL (ref 0.8–5.3)
LYMPHOCYTES NFR BLD AUTO: 53 %
MCH RBC QN AUTO: 32.4 PG (ref 26.5–33)
MCHC RBC AUTO-ENTMCNC: 35.2 G/DL (ref 31.5–36.5)
MCV RBC AUTO: 92 FL (ref 78–100)
MONOCYTES # BLD AUTO: 0.5 10E3/UL (ref 0–1.3)
MONOCYTES NFR BLD AUTO: 7 %
NEUTROPHILS # BLD AUTO: 2.8 10E3/UL (ref 1.6–8.3)
NEUTROPHILS NFR BLD AUTO: 38 %
NRBC # BLD AUTO: 0 10E3/UL
NRBC BLD AUTO-RTO: 0 /100
P AXIS - MUSE: 70 DEGREES
PLATELET # BLD AUTO: 171 10E3/UL (ref 150–450)
POTASSIUM SERPL-SCNC: 3.4 MMOL/L (ref 3.4–5.3)
PR INTERVAL - MUSE: 144 MS
QRS DURATION - MUSE: 94 MS
QT - MUSE: 374 MS
QTC - MUSE: 412 MS
R AXIS - MUSE: 68 DEGREES
RBC # BLD AUTO: 4.17 10E6/UL (ref 3.8–5.2)
RSV RNA SPEC NAA+PROBE: NEGATIVE
SARS-COV-2 RNA RESP QL NAA+PROBE: NEGATIVE
SODIUM SERPL-SCNC: 139 MMOL/L (ref 135–145)
SYSTOLIC BLOOD PRESSURE - MUSE: NORMAL MMHG
T AXIS - MUSE: 53 DEGREES
TROPONIN T SERPL HS-MCNC: <6 NG/L
VENTRICULAR RATE- MUSE: 73 BPM
WBC # BLD AUTO: 7.4 10E3/UL (ref 4–11)

## 2024-06-12 PROCEDURE — 36415 COLL VENOUS BLD VENIPUNCTURE: CPT | Performed by: EMERGENCY MEDICINE

## 2024-06-12 PROCEDURE — 84484 ASSAY OF TROPONIN QUANT: CPT | Performed by: EMERGENCY MEDICINE

## 2024-06-12 PROCEDURE — 99285 EMERGENCY DEPT VISIT HI MDM: CPT | Mod: 25

## 2024-06-12 PROCEDURE — 87637 SARSCOV2&INF A&B&RSV AMP PRB: CPT | Performed by: EMERGENCY MEDICINE

## 2024-06-12 PROCEDURE — 250N000013 HC RX MED GY IP 250 OP 250 PS 637: Performed by: EMERGENCY MEDICINE

## 2024-06-12 PROCEDURE — 93005 ELECTROCARDIOGRAM TRACING: CPT

## 2024-06-12 PROCEDURE — 85025 COMPLETE CBC W/AUTO DIFF WBC: CPT | Performed by: EMERGENCY MEDICINE

## 2024-06-12 PROCEDURE — 71046 X-RAY EXAM CHEST 2 VIEWS: CPT

## 2024-06-12 PROCEDURE — 80048 BASIC METABOLIC PNL TOTAL CA: CPT | Performed by: EMERGENCY MEDICINE

## 2024-06-12 PROCEDURE — 93005 ELECTROCARDIOGRAM TRACING: CPT | Mod: RTG

## 2024-06-12 RX ORDER — IBUPROFEN 600 MG/1
600 TABLET, FILM COATED ORAL ONCE
Status: COMPLETED | OUTPATIENT
Start: 2024-06-12 | End: 2024-06-12

## 2024-06-12 RX ORDER — ACETAMINOPHEN 325 MG/1
975 TABLET ORAL ONCE
Status: COMPLETED | OUTPATIENT
Start: 2024-06-12 | End: 2024-06-12

## 2024-06-12 RX ADMIN — ACETAMINOPHEN 975 MG: 325 TABLET, FILM COATED ORAL at 02:26

## 2024-06-12 RX ADMIN — IBUPROFEN 600 MG: 600 TABLET, FILM COATED ORAL at 02:26

## 2024-06-12 ASSESSMENT — ACTIVITIES OF DAILY LIVING (ADL)
ADLS_ACUITY_SCORE: 35
ADLS_ACUITY_SCORE: 35

## 2024-06-12 NOTE — ED PROVIDER NOTES
"    History     Chief Complaint:  Chest Pain       HPI   Muna Husain is a 24 year old female who presents emergency department with chest and back pain that began yesterday evening.  She describes a chest tightness that is been waxing and waning.  It has been keeping her up at night.  She denies any cough, congestion, fever, chills, nausea or vomiting.  No pain or swelling in the lower extremities.  No abdominal pain.  She has had some nausea but no vomiting.  She notes some anorexia but has been able to eat tonight.  Denies any significant stress or anxiety.    Medications:    IRON-VIT C-VIT B12-FOLIC ACID (GENTLE IRON) 28-60-0.008-0.4 MG CAPS  metoclopramide (REGLAN) 5 MG tablet  norgestrel-ethinyl estradiol (LO/OVRAL) 0.3-30 MG-MCG tablet  omeprazole (PRILOSEC) 20 MG DR capsule        Past Medical History:    Past Medical History:   Diagnosis Date    Heavy menstrual bleeding        Past Surgical History:    No past surgical history on file.       Physical Exam   Patient Vitals for the past 24 hrs:   BP Temp Temp src Pulse Resp SpO2 Height Weight   06/12/24 0103 104/74 -- -- -- -- -- -- --   06/12/24 0101 -- 98.2  F (36.8  C) Temporal 75 18 100 % -- --   06/12/24 0100 -- -- -- -- -- -- 1.651 m (5' 5\") 55 kg (121 lb 4.1 oz)        Physical Exam  General: Patient is awake, alert and interactive  Head: The scalp, face, and head appear normal  Eyes: The pupils are equal, round, and reactive to light. Conjunctivae and sclerae are normal  Neck: Normal range of motion.  CV: Regular rate and rhythm. Peripheral pulses including radial pulses are symmetric.   Resp: Lungs are clear without wheezes or rales. No respiratory distress.   GI: Abdomen is soft, no rigidity, guarding, or rebound. No distension. No tenderness to palpation in any quadrant.     MS: Chest wall is non tender to palpation. No asymmetric leg swelling, calf or thigh tenderness.    Skin: No rash or lesions noted. Normal capillary refill noted  Neuro: " Speech is normal and fluent. Face is symmetric. Moving all extremities.   Psych:  Normal affect.  Appropriate interactions.       Emergency Department Course   ECG  ECG results from 06/12/24   EKG 12-lead, tracing only     Value    Systolic Blood Pressure     Diastolic Blood Pressure     Ventricular Rate 73    Atrial Rate 73    MT Interval 144    QRS Duration 94        QTc 412    P Axis 70    R AXIS 68    T Axis 53    Interpretation ECG      Sinus rhythm with sinus arrhythmia  Normal ECG  When compared with ECG of 25-JAN-2022 16:40,  No significant change was found        Imaging:  XR Chest 2 Views   Final Result   IMPRESSION: Negative chest.          Laboratory:  Labs Ordered and Resulted from Time of ED Arrival to Time of ED Departure   BASIC METABOLIC PANEL - Abnormal       Result Value    Sodium 139      Potassium 3.4      Chloride 103      Carbon Dioxide (CO2) 23      Anion Gap 13      Urea Nitrogen 7.8      Creatinine 0.75      GFR Estimate >90      Calcium 9.2      Glucose 110 (*)    TROPONIN T, HIGH SENSITIVITY - Normal    Troponin T, High Sensitivity <6     INFLUENZA A/B, RSV, & SARS-COV2 PCR - Normal    Influenza A PCR Negative      Influenza B PCR Negative      RSV PCR Negative      SARS CoV2 PCR Negative     CBC WITH PLATELETS AND DIFFERENTIAL    WBC Count 7.4      RBC Count 4.17      Hemoglobin 13.5      Hematocrit 38.3      MCV 92      MCH 32.4      MCHC 35.2      RDW 12.3      Platelet Count 171      % Neutrophils 38      % Lymphocytes 53      % Monocytes 7      % Eosinophils 2      % Basophils 1      % Immature Granulocytes 0      NRBCs per 100 WBC 0      Absolute Neutrophils 2.8      Absolute Lymphocytes 3.9      Absolute Monocytes 0.5      Absolute Eosinophils 0.1      Absolute Basophils 0.0      Absolute Immature Granulocytes 0.0      Absolute NRBCs 0.0        Emergency Department Course & Assessments:    Interventions:  Medications   acetaminophen (TYLENOL) tablet 975 mg (has no  administration in time range)   ibuprofen (ADVIL/MOTRIN) tablet 600 mg (has no administration in time range)      Independent Interpretation (X-rays, CTs, rhythm strip):  CXR is negative for PNA     Disposition:  The patient was discharged.    Impression & Plan      Medical Decision Making:  Muna Husain is a 24 year old female child who presents for evaluation of chest pain.  I believe this patient is very low risk for PE.  The work up in the Emergency Department is negative.  I considered a broad differential diagnosis for the patient's chest pain including life threatening etiologies such as HOCM, anomalous coronary artery, acute coronary syndrome, myocardial infarction, pulmonary embolism, dissection, myocarditis, pericarditis, amongst others.  Other causes considered included pneumonia, pneumothorax or pneumomediastinum, chest wall source, pericarditis, pleurisy, esophageal spasm, etc. No serious etiology for the chest pain were detected today during this visit.  Close follow up with primary care is indicated should the pain continue, as further work up may be performed; this was made clear to the patient and family, who understands.      Diagnosis:    ICD-10-CM    1. Chest pain, unspecified type  R07.9            MD Pérez Vaughan Christopher Joseph, MD  06/12/24 0226

## 2024-06-12 NOTE — ED TRIAGE NOTES
Pt arrives with complaint of chest pain since yesterday evening. Describes a tightness in her chest, says it kept her awake. A&Ox4.

## 2024-10-17 ENCOUNTER — HOSPITAL ENCOUNTER (EMERGENCY)
Facility: CLINIC | Age: 24
Discharge: HOME | End: 2024-10-17
Attending: EMERGENCY MEDICINE | Admitting: EMERGENCY MEDICINE
Payer: COMMERCIAL

## 2024-10-17 VITALS
BODY MASS INDEX: 19.43 KG/M2 | RESPIRATION RATE: 18 BRPM | WEIGHT: 116.62 LBS | SYSTOLIC BLOOD PRESSURE: 106 MMHG | OXYGEN SATURATION: 100 % | HEART RATE: 86 BPM | TEMPERATURE: 97.9 F | HEIGHT: 65 IN | DIASTOLIC BLOOD PRESSURE: 88 MMHG

## 2024-10-17 DIAGNOSIS — R00.2 PALPITATIONS: ICD-10-CM

## 2024-10-17 LAB
ABO/RH(D): NORMAL
ANION GAP SERPL CALCULATED.3IONS-SCNC: 14 MMOL/L (ref 7–15)
ANTIBODY SCREEN: NEGATIVE
ATRIAL RATE - MUSE: 66 BPM
BASOPHILS # BLD AUTO: 0 10E3/UL (ref 0–0.2)
BASOPHILS NFR BLD AUTO: 0 %
BUN SERPL-MCNC: 8 MG/DL (ref 6–20)
CALCIUM SERPL-MCNC: 8.8 MG/DL (ref 8.8–10.4)
CHLORIDE SERPL-SCNC: 102 MMOL/L (ref 98–107)
CREAT SERPL-MCNC: 0.71 MG/DL (ref 0.51–0.95)
DIASTOLIC BLOOD PRESSURE - MUSE: NORMAL MMHG
EGFRCR SERPLBLD CKD-EPI 2021: >90 ML/MIN/1.73M2
EOSINOPHIL # BLD AUTO: 0.2 10E3/UL (ref 0–0.7)
EOSINOPHIL NFR BLD AUTO: 3 %
ERYTHROCYTE [DISTWIDTH] IN BLOOD BY AUTOMATED COUNT: 12.4 % (ref 10–15)
GLUCOSE SERPL-MCNC: 97 MG/DL (ref 70–99)
HCG INTACT+B SERPL-ACNC: <1 MIU/ML
HCO3 SERPL-SCNC: 22 MMOL/L (ref 22–29)
HCT VFR BLD AUTO: 39.4 % (ref 35–47)
HGB BLD-MCNC: 13.9 G/DL (ref 11.7–15.7)
HOLD SPECIMEN: NORMAL
IMM GRANULOCYTES # BLD: 0 10E3/UL
IMM GRANULOCYTES NFR BLD: 0 %
INTERPRETATION ECG - MUSE: NORMAL
LYMPHOCYTES # BLD AUTO: 3.4 10E3/UL (ref 0.8–5.3)
LYMPHOCYTES NFR BLD AUTO: 49 %
MAGNESIUM SERPL-MCNC: 2.1 MG/DL (ref 1.7–2.3)
MCH RBC QN AUTO: 32.2 PG (ref 26.5–33)
MCHC RBC AUTO-ENTMCNC: 35.3 G/DL (ref 31.5–36.5)
MCV RBC AUTO: 91 FL (ref 78–100)
MONOCYTES # BLD AUTO: 0.5 10E3/UL (ref 0–1.3)
MONOCYTES NFR BLD AUTO: 7 %
NEUTROPHILS # BLD AUTO: 2.8 10E3/UL (ref 1.6–8.3)
NEUTROPHILS NFR BLD AUTO: 41 %
NRBC # BLD AUTO: 0 10E3/UL
NRBC BLD AUTO-RTO: 0 /100
P AXIS - MUSE: -11 DEGREES
PLATELET # BLD AUTO: 208 10E3/UL (ref 150–450)
POTASSIUM SERPL-SCNC: 4.4 MMOL/L (ref 3.4–5.3)
PR INTERVAL - MUSE: 128 MS
QRS DURATION - MUSE: 88 MS
QT - MUSE: 396 MS
QTC - MUSE: 415 MS
R AXIS - MUSE: 70 DEGREES
RBC # BLD AUTO: 4.32 10E6/UL (ref 3.8–5.2)
SODIUM SERPL-SCNC: 138 MMOL/L (ref 135–145)
SPECIMEN EXPIRATION DATE: NORMAL
SYSTOLIC BLOOD PRESSURE - MUSE: NORMAL MMHG
T AXIS - MUSE: 53 DEGREES
TROPONIN T SERPL HS-MCNC: <6 NG/L
TSH SERPL DL<=0.005 MIU/L-ACNC: 1.33 UIU/ML (ref 0.3–4.2)
VENTRICULAR RATE- MUSE: 66 BPM
WBC # BLD AUTO: 6.9 10E3/UL (ref 4–11)

## 2024-10-17 PROCEDURE — 80048 BASIC METABOLIC PNL TOTAL CA: CPT | Performed by: EMERGENCY MEDICINE

## 2024-10-17 PROCEDURE — 84443 ASSAY THYROID STIM HORMONE: CPT | Performed by: EMERGENCY MEDICINE

## 2024-10-17 PROCEDURE — 84702 CHORIONIC GONADOTROPIN TEST: CPT | Performed by: EMERGENCY MEDICINE

## 2024-10-17 PROCEDURE — 83735 ASSAY OF MAGNESIUM: CPT | Performed by: EMERGENCY MEDICINE

## 2024-10-17 PROCEDURE — 86901 BLOOD TYPING SEROLOGIC RH(D): CPT | Performed by: EMERGENCY MEDICINE

## 2024-10-17 PROCEDURE — 36415 COLL VENOUS BLD VENIPUNCTURE: CPT | Performed by: EMERGENCY MEDICINE

## 2024-10-17 PROCEDURE — 86900 BLOOD TYPING SEROLOGIC ABO: CPT | Performed by: EMERGENCY MEDICINE

## 2024-10-17 PROCEDURE — 99284 EMERGENCY DEPT VISIT MOD MDM: CPT

## 2024-10-17 PROCEDURE — 93005 ELECTROCARDIOGRAM TRACING: CPT

## 2024-10-17 PROCEDURE — 84484 ASSAY OF TROPONIN QUANT: CPT | Performed by: EMERGENCY MEDICINE

## 2024-10-17 PROCEDURE — 85025 COMPLETE CBC W/AUTO DIFF WBC: CPT | Performed by: EMERGENCY MEDICINE

## 2024-10-17 ASSESSMENT — ACTIVITIES OF DAILY LIVING (ADL)
ADLS_ACUITY_SCORE: 35
ADLS_ACUITY_SCORE: 35

## 2024-10-17 NOTE — ED TRIAGE NOTES
"Couldn't sleep tonight feeling \"heart beat\" and shortness of breath.  Hx of \"long periods resulting in anemia\"     Triage Assessment (Adult)       Row Name 10/17/24 0121          Triage Assessment    Airway WDL WDL        Respiratory WDL    Respiratory WDL rhythm/pattern     Rhythm/Pattern, Respiratory shortness of breath                     "

## 2024-10-17 NOTE — ED PROVIDER NOTES
"  Emergency Department Note      History of Present Illness     Chief Complaint   Palpitations      HPI   Muna Husain is a 24 year old female with history of dysmenorrhea presenting with palpitations.  Patient reports for the past day having palpitations.  She reports that warm sensation over her chest though denies any chest pain.  No fever, chills, cough, dyspnea, abdominal pain, vomiting, diarrhea, dysuria, black/bloody stools.  She does report being on her menses though denies bleeding more than a pad an hour.  No history of hormone use or blood clots.  No recent prolonged travel.  Patient expresses concerns for anemia which may be precipitating her presentation.    Independent Historian   None    Review of External Notes     6/12/24 ED visit  Past Medical History     Medical History and Problem List   Past Medical History:   Diagnosis Date    Heavy menstrual bleeding        Medications   None    Surgical History   No past surgical history on file.    Physical Exam     Patient Vitals for the past 24 hrs:   BP Temp Temp src Pulse Resp SpO2 Height Weight   10/17/24 0119 101/85 97.9  F (36.6  C) Temporal 80 18 100 % 1.651 m (5' 5\") 52.9 kg (116 lb 10 oz)     Physical Exam  Nursing note and vitals reviewed.  Constitutional: Thin appearing  Eyes: Conjunctiva normal.  Pupils are equal, round, and reactive to light.   ENT: Nose normal. Mucous membranes pink and moist.    Neck: Normal range of motion.  CVS: Normal rate, regular rhythm.  Normal heart sounds.  Pulmonary: Lungs clear to auscultation bilaterally. No wheezes/rales/rhonchi.  GI: Abdomen soft. Nontender, nondistended. No rigidity or guarding.    MSK: No calf tenderness or swelling.  Neuro: Alert. Follows simple commands.  Skin: Skin is warm and dry. No rash noted.   Psychiatric: Normal affect.       Diagnostics     Lab Results   Labs Ordered and Resulted from Time of ED Arrival to Time of ED Departure   BASIC METABOLIC PANEL - Normal       Result Value    " Sodium 138      Potassium 4.4      Chloride 102      Carbon Dioxide (CO2) 22      Anion Gap 14      Urea Nitrogen 8.0      Creatinine 0.71      GFR Estimate >90      Calcium 8.8      Glucose 97     TROPONIN T, HIGH SENSITIVITY - Normal    Troponin T, High Sensitivity <6     TSH WITH FREE T4 REFLEX - Normal    TSH 1.33     MAGNESIUM - Normal    Magnesium 2.1     HCG QUANTITATIVE PREGNANCY - Normal    hCG Quantitative <1     CBC WITH PLATELETS AND DIFFERENTIAL    WBC Count 6.9      RBC Count 4.32      Hemoglobin 13.9      Hematocrit 39.4      MCV 91      MCH 32.2      MCHC 35.3      RDW 12.4      Platelet Count 208      % Neutrophils 41      % Lymphocytes 49      % Monocytes 7      % Eosinophils 3      % Basophils 0      % Immature Granulocytes 0      NRBCs per 100 WBC 0      Absolute Neutrophils 2.8      Absolute Lymphocytes 3.4      Absolute Monocytes 0.5      Absolute Eosinophils 0.2      Absolute Basophils 0.0      Absolute Immature Granulocytes 0.0      Absolute NRBCs 0.0     TYPE AND SCREEN, ADULT    ABO/RH(D) O POS      Antibody Screen Negative      SPECIMEN EXPIRATION DATE [id number]     ABO/RH TYPE AND SCREEN       Imaging   No orders to display       EKG   ECG results from 10/17/24   EKG 12-lead, tracing only     Value    Systolic Blood Pressure     Diastolic Blood Pressure     Ventricular Rate 66    Atrial Rate 66    AZ Interval 128    QRS Duration 88        QTc 415    P Axis -11    R AXIS 70    T Axis 53    Interpretation ECG      Sinus rhythm  Normal ECG  When compared with ECG of 12-Jun-2024 01:06,  No significant change was found           Independent Interpretation   None    ED Course      Medications Administered   Medications - No data to display    Procedures   Procedures     Discussion of Management   None    ED Course        Additional Documentation  None    Medical Decision Making / Diagnosis     CMS Diagnoses: None    MIPS       None    [facility]   Muna Husain is a 24 year old female  presented with a sensation of palpitations.  The work up in the Emergency Department is negative, monitoring and EKG have not shown any abnormal heart rhythms or concerning morphologies.  I considered a broad differential diagnosis including acute coronary syndrome, myocardial infarction, pulmonary embolism, electrolyte abnormalities, drug reaction, anxiety, amongst others.  Electrolytes are normal and the patient is not anemic.  The patient is not pregnant.  No EKG changes or troponin elevation concerning for acute coronary syndrome.  The patient is PERC negative.   No serious etiology for the palpitations were detected today during this visit and I feel the patient is safe for discharge.   Will provide PCP referral on dispo; discussed can order [facility] at this time though patient feels comfortable deferring. Close follow up with primary care is indicated should the symptoms continue, as further work up may be performed; this was made clear to the patient, who expressed understanding in teachback.           Disposition   The patient was discharged.     Diagnosis     ICD-10-CM    1. Palpitations  R00.2            Discharge Medications   New Prescriptions    No medications on file         DO Willy Harris Lindsey E, DO  10/17/24 0345